# Patient Record
Sex: FEMALE | Race: BLACK OR AFRICAN AMERICAN | NOT HISPANIC OR LATINO | Employment: UNEMPLOYED | URBAN - METROPOLITAN AREA
[De-identification: names, ages, dates, MRNs, and addresses within clinical notes are randomized per-mention and may not be internally consistent; named-entity substitution may affect disease eponyms.]

---

## 2023-07-07 ENCOUNTER — HOSPITAL ENCOUNTER (OUTPATIENT)
Dept: DATA CONVERSION | Facility: HOSPITAL | Age: 28
End: 2023-07-07
Attending: STUDENT IN AN ORGANIZED HEALTH CARE EDUCATION/TRAINING PROGRAM
Payer: COMMERCIAL

## 2023-07-07 DIAGNOSIS — R20.0 ANESTHESIA OF SKIN: ICD-10-CM

## 2023-07-07 DIAGNOSIS — O36.5930 MATERNAL CARE FOR OTHER KNOWN OR SUSPECTED POOR FETAL GROWTH, THIRD TRIMESTER, NOT APPLICABLE OR UNSPECIFIED (HHS-HCC): ICD-10-CM

## 2023-07-07 DIAGNOSIS — R42 DIZZINESS AND GIDDINESS: ICD-10-CM

## 2023-07-07 DIAGNOSIS — O26.893 OTHER SPECIFIED PREGNANCY RELATED CONDITIONS, THIRD TRIMESTER (HHS-HCC): ICD-10-CM

## 2023-07-07 DIAGNOSIS — E66.9 OBESITY, UNSPECIFIED: ICD-10-CM

## 2023-07-07 DIAGNOSIS — O99.343 OTHER MENTAL DISORDERS COMPLICATING PREGNANCY, THIRD TRIMESTER (HHS-HCC): ICD-10-CM

## 2023-07-07 DIAGNOSIS — R55 SYNCOPE AND COLLAPSE: ICD-10-CM

## 2023-07-07 DIAGNOSIS — O99.213 OBESITY COMPLICATING PREGNANCY, THIRD TRIMESTER (HHS-HCC): ICD-10-CM

## 2023-07-07 DIAGNOSIS — H53.8 OTHER VISUAL DISTURBANCES: ICD-10-CM

## 2023-07-07 DIAGNOSIS — Z3A.32 32 WEEKS GESTATION OF PREGNANCY (HHS-HCC): ICD-10-CM

## 2023-07-07 DIAGNOSIS — F32.A DEPRESSION, UNSPECIFIED: ICD-10-CM

## 2023-07-07 DIAGNOSIS — R20.2 PARESTHESIA OF SKIN: ICD-10-CM

## 2023-07-07 DIAGNOSIS — O99.891 OTHER SPECIFIED DISEASES AND CONDITIONS COMPLICATING PREGNANCY (HHS-HCC): ICD-10-CM

## 2023-07-07 DIAGNOSIS — Z34.80 ENCOUNTER FOR SUPERVISION OF OTHER NORMAL PREGNANCY, UNSPECIFIED TRIMESTER (HHS-HCC): ICD-10-CM

## 2023-07-07 LAB
ALANINE AMINOTRANSFERASE (SGPT) (U/L) IN SER/PLAS: 11 U/L (ref 7–45)
ALBUMIN (G/DL) IN SER/PLAS: 3.7 G/DL (ref 3.4–5)
ALKALINE PHOSPHATASE (U/L) IN SER/PLAS: 116 U/L (ref 33–110)
ANION GAP IN SER/PLAS: 11 MMOL/L (ref 10–20)
ASPARTATE AMINOTRANSFERASE (SGOT) (U/L) IN SER/PLAS: 10 U/L (ref 9–39)
BILIRUBIN TOTAL (MG/DL) IN SER/PLAS: 0.4 MG/DL (ref 0–1.2)
CALCIUM (MG/DL) IN SER/PLAS: 8.8 MG/DL (ref 8.6–10.6)
CARBON DIOXIDE, TOTAL (MMOL/L) IN SER/PLAS: 24 MMOL/L (ref 21–32)
CHLORIDE (MMOL/L) IN SER/PLAS: 105 MMOL/L (ref 98–107)
CREATININE (MG/DL) IN SER/PLAS: 0.53 MG/DL (ref 0.5–1.05)
ERYTHROCYTE DISTRIBUTION WIDTH (RATIO) BY AUTOMATED COUNT: 13.3 % (ref 11.5–14.5)
ERYTHROCYTE MEAN CORPUSCULAR HEMOGLOBIN CONCENTRATION (G/DL) BY AUTOMATED: 32.2 G/DL (ref 32–36)
ERYTHROCYTE MEAN CORPUSCULAR VOLUME (FL) BY AUTOMATED COUNT: 94 FL (ref 80–100)
ERYTHROCYTES (10*6/UL) IN BLOOD BY AUTOMATED COUNT: 4.19 X10E12/L (ref 4–5.2)
GFR FEMALE: >90 ML/MIN/1.73M2
GLUCOSE (MG/DL) IN SER/PLAS: 64 MG/DL (ref 74–99)
HEMATOCRIT (%) IN BLOOD BY AUTOMATED COUNT: 39.4 % (ref 36–46)
HEMOGLOBIN (G/DL) IN BLOOD: 12.7 G/DL (ref 12–16)
LEUKOCYTES (10*3/UL) IN BLOOD BY AUTOMATED COUNT: 7.8 X10E9/L (ref 4.4–11.3)
NRBC (PER 100 WBCS) BY AUTOMATED COUNT: 0 /100 WBC (ref 0–0)
PLATELETS (10*3/UL) IN BLOOD AUTOMATED COUNT: 215 X10E9/L (ref 150–450)
POCT GLUCOSE: 58 MG/DL (ref 74–99)
POCT GLUCOSE: 73 MG/DL (ref 74–99)
POTASSIUM (MMOL/L) IN SER/PLAS: 4.1 MMOL/L (ref 3.5–5.3)
PROTEIN TOTAL: 7 G/DL (ref 6.4–8.2)
SODIUM (MMOL/L) IN SER/PLAS: 136 MMOL/L (ref 136–145)
UREA NITROGEN (MG/DL) IN SER/PLAS: 10 MG/DL (ref 6–23)

## 2023-08-08 ENCOUNTER — HOSPITAL ENCOUNTER (OUTPATIENT)
Dept: DATA CONVERSION | Facility: HOSPITAL | Age: 28
End: 2023-08-08
Attending: STUDENT IN AN ORGANIZED HEALTH CARE EDUCATION/TRAINING PROGRAM
Payer: COMMERCIAL

## 2023-08-08 DIAGNOSIS — E66.9 OBESITY, UNSPECIFIED: ICD-10-CM

## 2023-08-08 DIAGNOSIS — Z3A.37 37 WEEKS GESTATION OF PREGNANCY (HHS-HCC): ICD-10-CM

## 2023-08-08 DIAGNOSIS — O46.93 ANTEPARTUM HEMORRHAGE, UNSPECIFIED, THIRD TRIMESTER (HHS-HCC): ICD-10-CM

## 2023-08-08 DIAGNOSIS — O40.3XX0: ICD-10-CM

## 2023-08-08 DIAGNOSIS — O36.5930 MATERNAL CARE FOR OTHER KNOWN OR SUSPECTED POOR FETAL GROWTH, THIRD TRIMESTER, NOT APPLICABLE OR UNSPECIFIED (HHS-HCC): ICD-10-CM

## 2023-08-08 DIAGNOSIS — O26.853 SPOTTING COMPLICATING PREGNANCY, THIRD TRIMESTER (HHS-HCC): ICD-10-CM

## 2023-08-08 DIAGNOSIS — O99.213 OBESITY COMPLICATING PREGNANCY, THIRD TRIMESTER (HHS-HCC): ICD-10-CM

## 2023-08-08 LAB
CLUE CELLS WET PREP: NORMAL
TRICH WET PREP: NORMAL
WBC WET PREP: NORMAL /HPF
YEAST PRESENCE WET PREP: NORMAL

## 2023-08-09 LAB — URINE CULTURE: NORMAL

## 2023-09-29 VITALS — HEIGHT: 64 IN | BODY MASS INDEX: 35.45 KG/M2 | WEIGHT: 207.67 LBS

## 2023-09-29 VITALS — HEIGHT: 64 IN | WEIGHT: 203.04 LBS | BODY MASS INDEX: 34.66 KG/M2

## 2023-09-30 NOTE — PROGRESS NOTES
"    Current Stage:   Stage: Triage     OB Dating:   EDC/EGA:  ·  Final HAKEEM 28-Aug-2023   ·  EGA 37.1     Subjective Data:   Antepartum:  Vaginal Bleeding: Yes   Contractions/Abdominal Pain: Yes   Discharge/Loss of Fluid: No   Fetal Movement: Good   Fevers/Chills: No   Preeclampsia Symptoms: No   Antepartum:    Bradford is a 28yo  at 37.1wks by 6.1wk US who presents to triage for bleeding and an NST.  Patient states she received a BPP the other day and it was off for breathing and  she needed an NST.  She states she has \"been bleeding this whole pregnancy\".  She reports the most recent episode was 2-3 days ago.  It was dark red when she wiped.  Denies passage of clots.  Denies saturating pad or underwear.  Upon further discussion  with patient, she reports urine dips with blood present in the office.  Denies urinary symptoms.  Reports intermittent contractions.  Was closed on office exam today.  Denies LOF and endorses good fetal movement.    Pregnancy notable for:   - Follows w/ [facility]  - FGR, last growth : EFW 6% (2g), AC 6%  - Polyhydramnios 27.8cm on u  - Class 2 obesity  - Trich +, no MORGAN  - Varicella non-immune, offer PP  - GBS neg     OBHx:   - 2013  at 39wks, 6#11oz  - 3/2018  at 39wks, 6#2oz  - 2020  at 39wks, 6#13oz, shoulder dystocia  - 2021  at 38wks, FGR  GYNHx: Denies h/o abnl pap, trich as above  PMHx: Depression  PSHx: Denies  SHx: Denies tob/etoh/recreational drug use  FHx: Autism (brother), DM (MGM)  Meds: PNV, Tylenol PRN  Allergies: NKDA, fish        Objective Information:    Objective Information:      T   P  R  BP   MAP  SpO2   Value  36.6  92  16  118/71   87  100%  Date/Time  15:45  15:45  15:45  14:25   14:25  15:45  Range  (36.6C - 36.6C )  (84 - 101 )  (16 - 16 )  (118 - 118 )/ (71 - 71 )  (87 - 87 )  (97% - 100% )      Pain reported at  15:45: 0 = None      Physical Exam:   Constitutional: Alert, conversational, well-appearing "   Obstetric: FHT: 130, mod variability, +accels, -decels   [place]: irritability noted, irregular ctx q4-7min    SSE:  - Cervix visually not dilated  - No active bleeding from os  - No blood present in vault  - White discharge present   Eyes: Sclera white, EOM intact, no discharge or erythema   ENMT: No hearing deficit, no goiter present   Head/Neck: Normocephalic, atraumatic, full range  of motion intact   Respiratory/Thorax: No increased work of breathing,  no cough present, rate normal   Cardiovascular: No edema present   Gastrointestinal: Gravid   Genitourinary: No suprapubic tenderness   Musculoskeletal: Strength +5 in all extremities bilaterally   Extremities: No calf tenderness, redness or warmth   Neurological: A/O x3, conversational   Breast: No redness or warmth   Psychological: Behavior appropriate, interactive   Skin: No rashes or lesions      Testing:   NST Interpretation - Baby A:  ·  Baseline    ·  Variability moderate (amplitude range 6 to 25 bpm)   ·  Interpretation Reactive (2 15x15 accels)   ·  Accelerations Present   ·  Decelerations Absent     Assessment and Plan:        Additional Dx:   Vaginal bleeding during pregnancy: Entered Date: 08-Aug-2023  16:37   Non-stress test reactive: Entered Date: 22-Sep-2021 17:04    Assessment:    Bradford is a 26yo  at 37.1wks by 6.1wk US who presents to triage for bleeding and an NST    Vaginal Bleeding  - No active bleeding from os or blood present in vault  - Denies recent intercourse or trauma to abd  - Cervix: visually not dilated  - [place]: irritable, irregular ctx noted  - Blood Type: O+  - UCx and wet prep collected, will call for abnl results  - Precautions to return discussed    IUP at 37.1wks  - BPP this week 6/8, off for breathing, needed NST  - NST reactive  - Good fetal movement  - Continue routine prenatal care  - IOL at 38wks  - Precautions to return discussed     Maternal Well-being  - Vital signs stable and WNL  - All  questions and concerns addressed    Plan and tracing discussed with Dr. Curiel who reviewed tracing and agrees with d/c home.     Ivonne Govea, MSN, APRN, FNP-C      Attestation:   Note Completion:  I am a:  Advanced Practice Provider   Attending Only - Shared Visit with Advanced Practice Provider This is a shared visit.  I have reviewed the Advanced Practice Provider?s encounter note, approve the Advanced Practice Provider?s documentation,  and provide the following additional information from my personal encounter.    Comments/ Additional Findings    Patient seen. Agree with assessment and plan as documented. Briefly, patient is a  at 37w1d who presents with vaginal spotting at home and  for an NST. NST reactive. Speculum exam without e/o active bleeding. Stable for discharge.    Zulema Curiel MD  OB/GYN Attending Physician          Electronic Signatures:  Zulema Curiel)  (Signed 08-Aug-2023 16:54)   Authored: Note Completion   Co-Signer: Current Stage, OB Dating, Subjective Data, Objective Data,  Testing, Assessment and Plan, Note Completion  Ivonne Govea (APRN-CNP)  (Signed 08-Aug-2023 16:40)   Authored: Current Stage, OB Dating, Subjective Data,  Objective Data,  Testing, Assessment and Plan, Note Completion      Last Updated: 08-Aug-2023 16:54 by Zulema Curiel)

## 2023-09-30 NOTE — PROGRESS NOTES
Current Stage:   Stage: Triage     Subjective Data:   Antepartum:  Antepartum:    28yo  at 32.4wga () by 6w US presents from US for syncopal episode. She had the syncopal episode this morning during her US. Patient was lightheaded, had blurry vision,  numbness and tingling in her legs.    She had one syncopal episode () when patient fell in her kitchen. She also felt lightheaded one month ago, but no syncope.     Similar episodes in previous pregnancies.     No LOF, vaginal spotting 3 days ago, one contraction , baby is moving well.     Pregnancy notable for:   - Class 2 obesity  - FGR: EFW 1348g 7% AC 5% on     OBHx:   - 2013  at 39wks, 6#11oz  - 3/2018  at 39wks, 6#2oz  - 2020  at 39wks, 6#13oz, shoulder dystocia  - 2021  at 38wks, FGR  GYNHx: Denies h/o abnl pap, denies h/o STI  PMHx: Depression  PSHx: Denies  SHx: Denies tob/etoh/recreational drug use  FHx: Autism (brother), DM (MGM)  Meds: PNV, Tylenol PRN  Allergies: NKDA, fish          Physical Exam:   Constitutional: alert and oriented, no acute distress   Obstetric: TOCO: quiet  SVE: deferred   Eyes: EOMI   ENMT: MMM   Head/Neck: NCAT   Respiratory/Thorax: good respiratory effort   Cardiovascular: warm, well perfused   Gastrointestinal: abdomen gravid   Musculoskeletal: no deformities   Extremities: moving normally, no edema appreciated   Neurological: no gross deficits   Psychological: appropriate mood and affect   Skin: warm, dry      Testing:   NST Interpretation - Baby A:  ·  Baseline    ·  Variability moderate (amplitude range 6 to 25 bpm)   ·  Interpretation Reactive (2 15x15 accels)   ·  Accelerations +   ·  Decelerations -     Assessment and Plan:   Assessment:    28yo  at 32.4wga () by 6w US presents from US for syncopal episode.    Syncope  - VS wnl, EKG wnl, orthostatics neg - low concern for cardiac etiology  - CBC/CMP wnl - low concern for anemia/electrolyte abn  - BG low,  sx improved after PO - possibly d/t hypoglycemia vs vasovagal  - Did not fall  - Stable for dc, discussed return precautions    IUP  - NST reactive, reassuring  - PNC at [facility], up to date per pt    HPI written by Juanita Vang, MS3    D/w Dr. Karlene Duron MD  PGY-3, Obstetrics and Gynecology    Attestation:   Note Completion:  I am a:  Resident/Fellow   Attending Attestation I saw and evaluated the patient.  I personally obtained the key and critical portions of the history and physical exam or was physically present for key and  critical portions performed by the resident/fellow. I reviewed the resident/fellow?s documentation and discussed the patient with the resident/fellow.  I agree with the resident/fellow?s medical decision making as documented in the note.   I personally evaluated the patient on 2023         Electronic Signatures:  Lis Duron (Resident))  (Signed 2023 10:39)   Authored: Current Stage, Subjective Data, Objective Data,   Testing, Assessment and Plan, Note Completion  Meggan Soler)  (Signed 2023 19:23)   Authored: Subjective Data, Note Completion   Co-Signer: Current Stage, Subjective Data, Objective Data,  Testing, Assessment and Plan, Note Completion      Last Updated: 2023 19:23 by Meggan Soler)

## 2023-12-01 LAB — LAB AP CYTOLOGY REPORT FINAL EXTERNAL: NORMAL

## 2023-12-08 ENCOUNTER — ANCILLARY PROCEDURE (OUTPATIENT)
Dept: RADIOLOGY | Facility: CLINIC | Age: 28
End: 2023-12-08
Payer: COMMERCIAL

## 2023-12-08 DIAGNOSIS — Z03.74 ENCOUNTER FOR SUSPECTED PROBLEM WITH FETAL GROWTH RULED OUT: ICD-10-CM

## 2023-12-08 DIAGNOSIS — O36.5930 MATERNAL CARE FOR OTHER KNOWN OR SUSPECTED POOR FETAL GROWTH, THIRD TRIMESTER, NOT APPLICABLE OR UNSPECIFIED (HHS-HCC): ICD-10-CM

## 2023-12-08 PROCEDURE — 76801 OB US < 14 WKS SINGLE FETUS: CPT

## 2023-12-08 PROCEDURE — 76817 TRANSVAGINAL US OBSTETRIC: CPT

## 2023-12-08 PROCEDURE — 76856 US EXAM PELVIC COMPLETE: CPT

## 2023-12-08 PROCEDURE — 76817 TRANSVAGINAL US OBSTETRIC: CPT | Performed by: OBSTETRICS & GYNECOLOGY

## 2023-12-08 PROCEDURE — 76801 OB US < 14 WKS SINGLE FETUS: CPT | Performed by: OBSTETRICS & GYNECOLOGY

## 2023-12-22 ENCOUNTER — APPOINTMENT (OUTPATIENT)
Dept: RADIOLOGY | Facility: CLINIC | Age: 28
End: 2023-12-22
Payer: COMMERCIAL

## 2023-12-26 ENCOUNTER — HOSPITAL ENCOUNTER (OUTPATIENT)
Facility: HOSPITAL | Age: 28
Discharge: HOME | End: 2023-12-26
Attending: OBSTETRICS & GYNECOLOGY | Admitting: OBSTETRICS & GYNECOLOGY
Payer: COMMERCIAL

## 2023-12-26 ENCOUNTER — HOSPITAL ENCOUNTER (EMERGENCY)
Facility: HOSPITAL | Age: 28
Discharge: HOME | End: 2023-12-27
Payer: COMMERCIAL

## 2023-12-26 ENCOUNTER — HOSPITAL ENCOUNTER (OUTPATIENT)
Facility: HOSPITAL | Age: 28
End: 2023-12-26
Attending: OBSTETRICS & GYNECOLOGY | Admitting: OBSTETRICS & GYNECOLOGY
Payer: COMMERCIAL

## 2023-12-26 VITALS — HEART RATE: 78 BPM | SYSTOLIC BLOOD PRESSURE: 120 MMHG | DIASTOLIC BLOOD PRESSURE: 74 MMHG | OXYGEN SATURATION: 95 %

## 2023-12-26 VITALS
SYSTOLIC BLOOD PRESSURE: 104 MMHG | DIASTOLIC BLOOD PRESSURE: 64 MMHG | OXYGEN SATURATION: 97 % | HEART RATE: 91 BPM | WEIGHT: 190 LBS | RESPIRATION RATE: 18 BRPM | BODY MASS INDEX: 33.66 KG/M2 | TEMPERATURE: 96.8 F | HEIGHT: 63 IN

## 2023-12-26 PROCEDURE — 4500999001 HC ED NO CHARGE

## 2023-12-26 PROCEDURE — 99281 EMR DPT VST MAYX REQ PHY/QHP: CPT

## 2023-12-26 ASSESSMENT — COLUMBIA-SUICIDE SEVERITY RATING SCALE - C-SSRS
2. HAVE YOU ACTUALLY HAD ANY THOUGHTS OF KILLING YOURSELF?: NO
1. IN THE PAST MONTH, HAVE YOU WISHED YOU WERE DEAD OR WISHED YOU COULD GO TO SLEEP AND NOT WAKE UP?: NO
6. HAVE YOU EVER DONE ANYTHING, STARTED TO DO ANYTHING, OR PREPARED TO DO ANYTHING TO END YOUR LIFE?: NO

## 2023-12-27 NOTE — ED TRIAGE NOTES
PT REPORTS SLIP AND FALL DOWN 4 STAIRS AND IS REPORTING LOW BACK AND BUTT PAIN.  AND IS 7 WEEK OB. ENDORSES RIGHT SIED HEAD PAIN. DENIES LOC, DIZZINESS, BV, THINNERS.

## 2023-12-28 ENCOUNTER — ANCILLARY PROCEDURE (OUTPATIENT)
Dept: RADIOLOGY | Facility: CLINIC | Age: 28
End: 2023-12-28
Payer: COMMERCIAL

## 2023-12-28 DIAGNOSIS — Z03.74 ENCOUNTER FOR SUSPECTED PROBLEM WITH FETAL GROWTH RULED OUT: ICD-10-CM

## 2023-12-28 PROCEDURE — 76816 OB US FOLLOW-UP PER FETUS: CPT | Performed by: OBSTETRICS & GYNECOLOGY

## 2023-12-28 PROCEDURE — 76816 OB US FOLLOW-UP PER FETUS: CPT

## 2024-01-30 LAB
EXTERNAL CHLAMYDIA SCREEN: NEGATIVE
EXTERNAL GONORRHEA SCREEN: NEGATIVE

## 2024-02-01 ENCOUNTER — HOSPITAL ENCOUNTER (OUTPATIENT)
Dept: RADIOLOGY | Facility: CLINIC | Age: 29
Discharge: HOME | End: 2024-02-01
Payer: COMMERCIAL

## 2024-02-01 ENCOUNTER — APPOINTMENT (OUTPATIENT)
Dept: RADIOLOGY | Facility: CLINIC | Age: 29
End: 2024-02-01
Payer: COMMERCIAL

## 2024-02-01 DIAGNOSIS — Z36.82 ENCOUNTER FOR ANTENATAL SCREENING FOR NUCHAL TRANSLUCENCY (HHS-HCC): ICD-10-CM

## 2024-02-01 PROCEDURE — 76813 OB US NUCHAL MEAS 1 GEST: CPT

## 2024-02-01 PROCEDURE — 76813 OB US NUCHAL MEAS 1 GEST: CPT | Performed by: MEDICAL GENETICS

## 2024-02-23 ENCOUNTER — APPOINTMENT (OUTPATIENT)
Dept: RADIOLOGY | Facility: HOSPITAL | Age: 29
End: 2024-02-23
Payer: COMMERCIAL

## 2024-02-23 ENCOUNTER — CLINICAL SUPPORT (OUTPATIENT)
Dept: EMERGENCY MEDICINE | Facility: HOSPITAL | Age: 29
End: 2024-02-23
Payer: COMMERCIAL

## 2024-02-23 ENCOUNTER — HOSPITAL ENCOUNTER (EMERGENCY)
Facility: HOSPITAL | Age: 29
Discharge: HOME | End: 2024-02-23
Payer: COMMERCIAL

## 2024-02-23 VITALS
TEMPERATURE: 98.1 F | HEART RATE: 100 BPM | WEIGHT: 190 LBS | BODY MASS INDEX: 33.66 KG/M2 | HEIGHT: 63 IN | DIASTOLIC BLOOD PRESSURE: 74 MMHG | OXYGEN SATURATION: 96 % | RESPIRATION RATE: 18 BRPM | SYSTOLIC BLOOD PRESSURE: 115 MMHG

## 2024-02-23 DIAGNOSIS — Z34.92 SECOND TRIMESTER PREGNANCY (HHS-HCC): ICD-10-CM

## 2024-02-23 DIAGNOSIS — J10.1 INFLUENZA B: Primary | ICD-10-CM

## 2024-02-23 LAB
APPEARANCE UR: ABNORMAL
BILIRUB UR STRIP.AUTO-MCNC: NEGATIVE MG/DL
COLOR UR: YELLOW
FLUAV RNA RESP QL NAA+PROBE: NOT DETECTED
FLUBV RNA RESP QL NAA+PROBE: DETECTED
GLUCOSE UR STRIP.AUTO-MCNC: NORMAL MG/DL
KETONES UR STRIP.AUTO-MCNC: ABNORMAL MG/DL
LEUKOCYTE ESTERASE UR QL STRIP.AUTO: ABNORMAL
MUCOUS THREADS #/AREA URNS AUTO: ABNORMAL /LPF
NITRITE UR QL STRIP.AUTO: NEGATIVE
PH UR STRIP.AUTO: 6 [PH]
PROT UR STRIP.AUTO-MCNC: ABNORMAL MG/DL
RBC # UR STRIP.AUTO: ABNORMAL /UL
RBC #/AREA URNS AUTO: ABNORMAL /HPF
RSV RNA RESP QL NAA+PROBE: NOT DETECTED
S PYO DNA THROAT QL NAA+PROBE: NOT DETECTED
SARS-COV-2 RNA RESP QL NAA+PROBE: NOT DETECTED
SP GR UR STRIP.AUTO: 1.03
SQUAMOUS #/AREA URNS AUTO: ABNORMAL /HPF
UROBILINOGEN UR STRIP.AUTO-MCNC: NORMAL MG/DL
WBC #/AREA URNS AUTO: ABNORMAL /HPF

## 2024-02-23 PROCEDURE — 87086 URINE CULTURE/COLONY COUNT: CPT | Performed by: NURSE PRACTITIONER

## 2024-02-23 PROCEDURE — 2500000001 HC RX 250 WO HCPCS SELF ADMINISTERED DRUGS (ALT 637 FOR MEDICARE OP): Mod: SE | Performed by: NURSE PRACTITIONER

## 2024-02-23 PROCEDURE — 2500000002 HC RX 250 W HCPCS SELF ADMINISTERED DRUGS (ALT 637 FOR MEDICARE OP, ALT 636 FOR OP/ED): Mod: SE | Performed by: NURSE PRACTITIONER

## 2024-02-23 PROCEDURE — 87637 SARSCOV2&INF A&B&RSV AMP PRB: CPT | Performed by: EMERGENCY MEDICINE

## 2024-02-23 PROCEDURE — 99284 EMERGENCY DEPT VISIT MOD MDM: CPT | Mod: 25

## 2024-02-23 PROCEDURE — 87651 STREP A DNA AMP PROBE: CPT | Performed by: NURSE PRACTITIONER

## 2024-02-23 PROCEDURE — 81003 URINALYSIS AUTO W/O SCOPE: CPT | Performed by: NURSE PRACTITIONER

## 2024-02-23 PROCEDURE — 93005 ELECTROCARDIOGRAM TRACING: CPT

## 2024-02-23 PROCEDURE — 94644 CONT INHLJ TX 1ST HOUR: CPT

## 2024-02-23 PROCEDURE — 94640 AIRWAY INHALATION TREATMENT: CPT

## 2024-02-23 PROCEDURE — 71046 X-RAY EXAM CHEST 2 VIEWS: CPT | Performed by: RADIOLOGY

## 2024-02-23 PROCEDURE — 71046 X-RAY EXAM CHEST 2 VIEWS: CPT

## 2024-02-23 RX ORDER — GUAIFENESIN 100 MG/5ML
200 SOLUTION ORAL ONCE
Status: COMPLETED | OUTPATIENT
Start: 2024-02-23 | End: 2024-02-23

## 2024-02-23 RX ORDER — GUAIFENESIN 100 MG/5ML
100-200 SOLUTION ORAL EVERY 4 HOURS PRN
Qty: 120 ML | Refills: 0 | Status: SHIPPED | OUTPATIENT
Start: 2024-02-23 | End: 2024-03-24

## 2024-02-23 RX ORDER — ACETAMINOPHEN 325 MG/1
650 TABLET ORAL EVERY 6 HOURS PRN
Qty: 30 TABLET | Refills: 0 | Status: SHIPPED | OUTPATIENT
Start: 2024-02-23

## 2024-02-23 RX ORDER — ACETAMINOPHEN 325 MG/1
975 TABLET ORAL ONCE
Status: COMPLETED | OUTPATIENT
Start: 2024-02-23 | End: 2024-02-23

## 2024-02-23 RX ORDER — ALBUTEROL SULFATE 0.83 MG/ML
2.5 SOLUTION RESPIRATORY (INHALATION) ONCE
Status: COMPLETED | OUTPATIENT
Start: 2024-02-23 | End: 2024-02-23

## 2024-02-23 RX ADMIN — GUAIFENESIN 200 MG: 200 SOLUTION ORAL at 20:07

## 2024-02-23 RX ADMIN — ALBUTEROL SULFATE 2.5 MG: 2.5 SOLUTION RESPIRATORY (INHALATION) at 20:07

## 2024-02-23 RX ADMIN — ACETAMINOPHEN 975 MG: 325 TABLET ORAL at 20:07

## 2024-02-23 ASSESSMENT — PAIN DESCRIPTION - DESCRIPTORS: DESCRIPTORS: TIGHTNESS

## 2024-02-23 ASSESSMENT — LIFESTYLE VARIABLES
EVER FELT BAD OR GUILTY ABOUT YOUR DRINKING: NO
HAVE PEOPLE ANNOYED YOU BY CRITICIZING YOUR DRINKING: NO
EVER HAD A DRINK FIRST THING IN THE MORNING TO STEADY YOUR NERVES TO GET RID OF A HANGOVER: NO
HAVE YOU EVER FELT YOU SHOULD CUT DOWN ON YOUR DRINKING: NO

## 2024-02-23 ASSESSMENT — ENCOUNTER SYMPTOMS
CHILLS: 1
ABDOMINAL PAIN: 1
COUGH: 1
FATIGUE: 1

## 2024-02-23 ASSESSMENT — COLUMBIA-SUICIDE SEVERITY RATING SCALE - C-SSRS
1. IN THE PAST MONTH, HAVE YOU WISHED YOU WERE DEAD OR WISHED YOU COULD GO TO SLEEP AND NOT WAKE UP?: NO
2. HAVE YOU ACTUALLY HAD ANY THOUGHTS OF KILLING YOURSELF?: NO
6. HAVE YOU EVER DONE ANYTHING, STARTED TO DO ANYTHING, OR PREPARED TO DO ANYTHING TO END YOUR LIFE?: NO

## 2024-02-23 ASSESSMENT — PAIN - FUNCTIONAL ASSESSMENT: PAIN_FUNCTIONAL_ASSESSMENT: 0-10

## 2024-02-23 ASSESSMENT — PAIN SCALES - GENERAL
PAINLEVEL_OUTOF10: 4
PAINLEVEL_OUTOF10: 3

## 2024-02-23 ASSESSMENT — PAIN DESCRIPTION - PROGRESSION: CLINICAL_PROGRESSION: GRADUALLY IMPROVING

## 2024-02-23 NOTE — ED TRIAGE NOTES
"Cough, chest pain, shortness of breath, sore throat, headaches, body aches/ \"knots\", fevers and chills, with congestion for 2 days. She reports she is 15 weeks pregnant with due date of August 8th. She reports abdominal pain with body aches, nausea and vomiting - denies vaginal bleeding and discharge. Denies concern for STD's. This is pt's 6th pregnancy with 5 live births.   "

## 2024-02-24 LAB — HOLD SPECIMEN: NORMAL

## 2024-02-24 NOTE — ED PROVIDER NOTES
Emergency Department Encounter  [facility] EMERGENCY MEDICINE    Patient: Bradford Clinton  MRN: [id number]  : 1995  Date of Evaluation: 2024  ED Provider: RAMONA Santana      Chief Complaint       Chief Complaint   Patient presents with    Flu Symptoms    Abdominal Pain        Limitations to History: none  Historian: patient  Records reviewed: EMR inpatient and outpatient notes, Care Everywhere    This is a 28-year-old female who is approximately 15 weeks pregnant who presents to the emergency room with flu symptoms.  Patient states 2 days ago she developed diffuse body aches, headache, chest pain, shortness of breath and a nonproductive cough.  Denies any known sick contacts.  Patient endorses subjective fevers without any chills.  Patient endorses lower abdominal cramping.  Denies any vaginal bleeding or vaginal discharge.  Patient states that she vomited 4 times today but is able to tolerate water at this time.    PMH: Denies  PSH: Left wrist surgery  Allergies: Reviewed per EMR  Social HX: Denies smoking, alcohol or drug use.  Family HX: No family history pertinent to current presenting problem  Medications: Reviewed per EMR    ROS:     Review of Systems   Constitutional:  Positive for chills and fatigue.   Respiratory:  Positive for cough.    Gastrointestinal:  Positive for abdominal pain.     14 systems reviewed and otherwise acutely negative except as in the [place].        Past History     Past Medical History:   Diagnosis Date    Other conditions influencing health status 2021    No significant past medical history     Past Surgical History:   Procedure Laterality Date    OTHER SURGICAL HISTORY  2019    No history of surgery         Medications/Allergies     Previous Medications    ACETAMINOPHEN (TYLENOL) 325 MG TABLET    TAKE 3 TABLETS BY MOUTH EVERY 6 HOURS AS NEEDED FOR PAIN    IBUPROFEN 600 MG TABLET    TAKE 1 TABLET BY MOUTH EVERY 6 HOURS AS NEEDED FOR  PAIN AS DIRECTED     Allergies   Allergen Reactions    Ibuprofen Other     Itching, hives    Iodine Other    Shellfish Containing Products Other    Shellfish Derived Other        Physical Exam       ED Triage Vitals [02/23/24 1828]   Temperature Heart Rate Respirations BP   36.6 °C (97.9 °F) 98 18 116/67      Pulse Ox Temp Source Heart Rate Source Patient Position   98 % Temporal -- --      BP Location FiO2 (%)     -- 21 %       Physical Exam:    Appearance: Alert, oriented , cooperative,  in no acute distress. Well nourished & well hydrated.    Skin: Intact,  dry skin, no lesions, rash, petechiae or purpura.     Eyes: PERRLA, EOMs intact.    ENT: Hearing grossly intact. External auditory canals patent, tympanic membranes intact with visible landmarks. Nares patent, mucus membranes moist.     Neck: Supple, without meningismus.     Pulmonary: Clear lungs. No use of accessory muscles.    Cardiac: Normal S1, S2 without murmur, rub, gallop or extrasystole. No JVD, Carotids without bruits.    Abdomen: Soft, nontender, active bowel sounds.  No palpable organomegaly.  No rebound or guarding.      Musculoskeletal: Full range of motion. no pain, edema, or deformity. Pulses full and equal. No cyanosis, clubbing, or edema.    Neurological:  Normal sensation, no weakness, no focal findings identified.    Psychiatric: Appropriate mood and affect.       Diagnostics   Labs:  Results for orders placed or performed during the hospital encounter of 02/23/24 (from the past 24 hour(s))   Influenza A, and B PCR   Result Value Ref Range    Flu A Result Not Detected Not Detected    Flu B Result Detected (A) Not Detected   Sars-CoV-2 PCR   Result Value Ref Range    Coronavirus 2019, PCR Not Detected Not Detected   RSV PCR   Result Value Ref Range    RSV PCR Not Detected Not Detected   Group A Streptococcus, PCR    Specimen: Throat/Pharynx; Swab   Result Value Ref Range    Group A Strep PCR Not Detected Not Detected      Radiographs:  XR  "chest 2 views   Final Result   No acute cardiopulmonary process.        I personally reviewed the images/study and I agree with the findings   as stated above by resident physician, Dr. Danny Bar. The   study was interpreted at [facility] in [facility].        MACRO:   none.        Signed by: Melania Begum 2/23/2024 9:20 PM   Dictation workstation:   [id number]      Point of Care Ultrasound   Final Result                Assessment   In brief, Bradford Clinton is a 28 y.o. female who presented to the emergency department with body aches, chest pain, headache and flu symptoms.          ED Course/MDM   Visit Vitals  /74   Pulse 100   Temp 36.7 °C (98.1 °F) (Oral)   Resp 18   Ht 1.6 m (5' 3\")   Wt 86.2 kg (190 lb)   SpO2 96%   BMI 33.66 kg/m²   OB Status Pregnant   Smoking Status Never   BSA 1.96 m²       Medications   albuterol 2.5 mg /3 mL (0.083 %) nebulizer solution 2.5 mg (2.5 mg nebulization Given 2/23/24 2007)   acetaminophen (Tylenol) tablet 975 mg (975 mg oral Given 2/23/24 2007)   guaiFENesin (Robitussin) 100 mg/5 mL syrup 200 mg (200 mg oral Given 2/23/24 2007)       Patient remained stable while in the emergency department. Previous outpatient and ED records were reviewed.  Differentials include pneumonia, COVID, influenza, viral illness. Outside records were reviewed.  Bedside ultrasound was performed, please see procedure note for complete details.  Patient tested positive for flu B.  Chest x-ray shows no acute cardiopulmonary process.  Patient received 1 albuterol nebulizer, Tylenol and Robitussin for symptoms.  Urinalysis was obtained, pending results.  Patient was requesting to leave prior to the UA coming back.  Discussed results with the patient.  Repeat vital signs were stable.  Patient was requesting a prescription for a humidifier which was given to the patient.  Patient also received a prescription for Robitussin cough syrup and Tylenol.  " Patient was able to tolerate oral liquids while in the emergency room without difficulty.  Patient was discharged home and was advised to follow-up with her primary care doctor and return the emergency room with worsening symptoms.    Final Impression      1. Influenza B    2. Second trimester pregnancy          DISPOSITION  Disposition: Discharged home    Comment: Please note this report has been produced using speech recognition software and may contain errors related to that system including errors in grammar, punctuation, and spelling, as well as words and phrases that may be inappropriate.  If there are any questions or concerns please feel free to contact the dictating provider for clarification.    Kacey Magana, APRN-CNP         Kacey Magana APRN-JUANI  02/23/24 [id number]

## 2024-02-24 NOTE — ED PROCEDURE NOTE
Procedure    Performed by: Lynda Mckeon MD  Authorized by: Lynda Mckeon MD        Genitourinary Indications: evaluation for fetal cardiac activity and positive pregnancy test            Comments: . Dated 15w1d based on head circumference. Fetal activity and movement noted.                Lynda Mckeon MD  Resident  02/23/24 2021

## 2024-02-25 LAB
ATRIAL RATE: 110 BPM
BACTERIA UR CULT: NORMAL
P AXIS: 49 DEGREES
P OFFSET: 208 MS
P ONSET: 149 MS
PR INTERVAL: 154 MS
Q ONSET: 226 MS
QRS COUNT: 18 BEATS
QRS DURATION: 82 MS
QT INTERVAL: 324 MS
QTC CALCULATION(BAZETT): 438 MS
QTC FREDERICIA: 396 MS
R AXIS: -11 DEGREES
T AXIS: 22 DEGREES
T OFFSET: 388 MS
VENTRICULAR RATE: 110 BPM

## 2024-03-21 ENCOUNTER — HOSPITAL ENCOUNTER (OUTPATIENT)
Dept: RADIOLOGY | Facility: CLINIC | Age: 29
Discharge: HOME | End: 2024-03-21
Payer: COMMERCIAL

## 2024-03-21 ENCOUNTER — APPOINTMENT (OUTPATIENT)
Dept: RADIOLOGY | Facility: CLINIC | Age: 29
End: 2024-03-21
Payer: COMMERCIAL

## 2024-03-21 DIAGNOSIS — Z36.3 ENCOUNTER FOR ANTENATAL SCREENING FOR MALFORMATIONS (HHS-HCC): ICD-10-CM

## 2024-03-21 PROCEDURE — 76811 OB US DETAILED SNGL FETUS: CPT

## 2024-03-21 PROCEDURE — 76811 OB US DETAILED SNGL FETUS: CPT | Performed by: OBSTETRICS & GYNECOLOGY

## 2024-03-22 LAB — THYROTROPIN (MIU/L) IN SER/PLAS BY DETECTION LIMIT <= 0.05 MIU/L EXTERNAL: 0.79 MIU/L

## 2024-04-26 ENCOUNTER — HOSPITAL ENCOUNTER (OUTPATIENT)
Dept: RADIOLOGY | Facility: CLINIC | Age: 29
Discharge: HOME | End: 2024-04-26
Payer: COMMERCIAL

## 2024-04-26 DIAGNOSIS — Z03.74 ENCOUNTER FOR SUSPECTED PROBLEM WITH FETAL GROWTH RULED OUT: ICD-10-CM

## 2024-04-26 DIAGNOSIS — Z36.3 ENCOUNTER FOR ANTENATAL SCREENING FOR MALFORMATIONS (HHS-HCC): ICD-10-CM

## 2024-04-26 PROCEDURE — 76816 OB US FOLLOW-UP PER FETUS: CPT

## 2024-04-26 PROCEDURE — 76816 OB US FOLLOW-UP PER FETUS: CPT | Performed by: OBSTETRICS & GYNECOLOGY

## 2024-05-30 ENCOUNTER — HOSPITAL ENCOUNTER (OUTPATIENT)
Dept: RADIOLOGY | Facility: CLINIC | Age: 29
Discharge: HOME | End: 2024-05-30
Payer: COMMERCIAL

## 2024-05-30 DIAGNOSIS — Z36.3 ENCOUNTER FOR ANTENATAL SCREENING FOR MALFORMATIONS (HHS-HCC): ICD-10-CM

## 2024-05-30 PROCEDURE — 76816 OB US FOLLOW-UP PER FETUS: CPT

## 2024-05-30 PROCEDURE — 76819 FETAL BIOPHYS PROFIL W/O NST: CPT

## 2024-05-30 PROCEDURE — 76819 FETAL BIOPHYS PROFIL W/O NST: CPT | Performed by: OBSTETRICS & GYNECOLOGY

## 2024-05-30 PROCEDURE — 76816 OB US FOLLOW-UP PER FETUS: CPT | Performed by: OBSTETRICS & GYNECOLOGY

## 2024-06-23 ENCOUNTER — HOSPITAL ENCOUNTER (OUTPATIENT)
Facility: HOSPITAL | Age: 29
Discharge: HOME | End: 2024-06-23
Attending: STUDENT IN AN ORGANIZED HEALTH CARE EDUCATION/TRAINING PROGRAM | Admitting: STUDENT IN AN ORGANIZED HEALTH CARE EDUCATION/TRAINING PROGRAM
Payer: COMMERCIAL

## 2024-06-23 ENCOUNTER — HOSPITAL ENCOUNTER (OUTPATIENT)
Facility: HOSPITAL | Age: 29
End: 2024-06-23
Attending: STUDENT IN AN ORGANIZED HEALTH CARE EDUCATION/TRAINING PROGRAM | Admitting: STUDENT IN AN ORGANIZED HEALTH CARE EDUCATION/TRAINING PROGRAM
Payer: COMMERCIAL

## 2024-06-23 VITALS
HEART RATE: 92 BPM | SYSTOLIC BLOOD PRESSURE: 117 MMHG | RESPIRATION RATE: 18 BRPM | BODY MASS INDEX: 37.94 KG/M2 | HEIGHT: 64 IN | WEIGHT: 222.22 LBS | OXYGEN SATURATION: 97 % | DIASTOLIC BLOOD PRESSURE: 66 MMHG | TEMPERATURE: 97.3 F

## 2024-06-23 DIAGNOSIS — J10.1 INFLUENZA B: ICD-10-CM

## 2024-06-23 LAB
BILIRUBIN, POC: NEGATIVE
BLOOD URINE, POC: POSITIVE
CLARITY, POC: ABNORMAL
CLUE CELLS SPEC QL WET PREP: NORMAL
COLOR, POC: ABNORMAL
GLUCOSE URINE, POC: NEGATIVE
KETONES, POC: POSITIVE
LEUKOCYTE EST, POC: NEGATIVE
NITRITE, POC: NEGATIVE
PH, POC: 6.5
POC APPEARANCE OF BODY FLUID: ABNORMAL
SPECIFIC GRAVITY, POC: 1.02
T VAGINALIS SPEC QL WET PREP: NORMAL
URINE PROTEIN, POC: ABNORMAL
UROBILINOGEN, POC: 2
WBC VAG QL WET PREP: NORMAL
YEAST VAG QL WET PREP: NORMAL

## 2024-06-23 PROCEDURE — 99212 OFFICE O/P EST SF 10 MIN: CPT | Performed by: STUDENT IN AN ORGANIZED HEALTH CARE EDUCATION/TRAINING PROGRAM

## 2024-06-23 PROCEDURE — 87086 URINE CULTURE/COLONY COUNT: CPT

## 2024-06-23 PROCEDURE — 87081 CULTURE SCREEN ONLY: CPT | Mod: 59

## 2024-06-23 PROCEDURE — 87210 SMEAR WET MOUNT SALINE/INK: CPT

## 2024-06-23 PROCEDURE — 99215 OFFICE O/P EST HI 40 MIN: CPT

## 2024-06-23 RX ORDER — HYDRALAZINE HYDROCHLORIDE 20 MG/ML
5 INJECTION INTRAMUSCULAR; INTRAVENOUS ONCE AS NEEDED
Status: DISCONTINUED | OUTPATIENT
Start: 2024-06-23 | End: 2024-06-23 | Stop reason: HOSPADM

## 2024-06-23 RX ORDER — ACETAMINOPHEN 325 MG/1
975 TABLET ORAL ONCE
Status: COMPLETED | OUTPATIENT
Start: 2024-06-23 | End: 2024-06-23

## 2024-06-23 RX ORDER — ONDANSETRON HYDROCHLORIDE 2 MG/ML
4 INJECTION, SOLUTION INTRAVENOUS EVERY 6 HOURS PRN
Status: DISCONTINUED | OUTPATIENT
Start: 2024-06-23 | End: 2024-06-23 | Stop reason: HOSPADM

## 2024-06-23 RX ORDER — ASPIRIN 81 MG/1
81 TABLET ORAL DAILY
COMMUNITY

## 2024-06-23 RX ORDER — ONDANSETRON 4 MG/1
4 TABLET, FILM COATED ORAL EVERY 6 HOURS PRN
Status: DISCONTINUED | OUTPATIENT
Start: 2024-06-23 | End: 2024-06-23 | Stop reason: HOSPADM

## 2024-06-23 RX ORDER — NIFEDIPINE 10 MG/1
10 CAPSULE ORAL ONCE AS NEEDED
Status: DISCONTINUED | OUTPATIENT
Start: 2024-06-23 | End: 2024-06-23 | Stop reason: HOSPADM

## 2024-06-23 RX ORDER — LABETALOL HYDROCHLORIDE 5 MG/ML
20 INJECTION, SOLUTION INTRAVENOUS ONCE AS NEEDED
Status: DISCONTINUED | OUTPATIENT
Start: 2024-06-23 | End: 2024-06-23 | Stop reason: HOSPADM

## 2024-06-23 RX ORDER — LORATADINE 10 MG/1
10 TABLET ORAL DAILY
COMMUNITY

## 2024-06-23 RX ORDER — ACETAMINOPHEN 325 MG/1
975 TABLET ORAL EVERY 6 HOURS PRN
Qty: 30 TABLET | Refills: 0 | Status: SHIPPED | OUTPATIENT
Start: 2024-06-23

## 2024-06-23 RX ADMIN — ACETAMINOPHEN 975 MG: 325 TABLET ORAL at 12:10

## 2024-06-23 SDOH — SOCIAL STABILITY: SOCIAL INSECURITY: DO YOU FEEL ANYONE HAS EXPLOITED OR TAKEN ADVANTAGE OF YOU FINANCIALLY OR OF YOUR PERSONAL PROPERTY?: NO

## 2024-06-23 SDOH — SOCIAL STABILITY: SOCIAL INSECURITY: HAVE YOU HAD ANY THOUGHTS OF HARMING ANYONE ELSE?: NO

## 2024-06-23 SDOH — HEALTH STABILITY: MENTAL HEALTH: NON-SPECIFIC ACTIVE SUICIDAL THOUGHTS (PAST 1 MONTH): NO

## 2024-06-23 SDOH — HEALTH STABILITY: MENTAL HEALTH: HAVE YOU USED ANY SUBSTANCES (CANABIS, COCAINE, HEROIN, HALLUCINOGENS, INHALANTS, ETC.) IN THE PAST 12 MONTHS?: NO

## 2024-06-23 SDOH — HEALTH STABILITY: MENTAL HEALTH: HAVE YOU USED ANY PRESCRIPTION DRUGS OTHER THAN PRESCRIBED IN THE PAST 12 MONTHS?: NO

## 2024-06-23 SDOH — HEALTH STABILITY: MENTAL HEALTH: WERE YOU ABLE TO COMPLETE ALL THE BEHAVIORAL HEALTH SCREENINGS?: YES

## 2024-06-23 SDOH — SOCIAL STABILITY: SOCIAL INSECURITY: HAS ANYONE EVER THREATENED TO HURT YOUR FAMILY OR YOUR PETS?: NO

## 2024-06-23 SDOH — SOCIAL STABILITY: SOCIAL INSECURITY: PHYSICAL ABUSE: DENIES

## 2024-06-23 SDOH — SOCIAL STABILITY: SOCIAL INSECURITY: HAVE YOU HAD THOUGHTS OF HARMING ANYONE ELSE?: NO

## 2024-06-23 SDOH — SOCIAL STABILITY: SOCIAL INSECURITY: DOES ANYONE TRY TO KEEP YOU FROM HAVING/CONTACTING OTHER FRIENDS OR DOING THINGS OUTSIDE YOUR HOME?: NO

## 2024-06-23 SDOH — SOCIAL STABILITY: SOCIAL INSECURITY: ARE YOU OR HAVE YOU BEEN THREATENED OR ABUSED PHYSICALLY, EMOTIONALLY, OR SEXUALLY BY ANYONE?: NO

## 2024-06-23 SDOH — HEALTH STABILITY: MENTAL HEALTH: WISH TO BE DEAD (PAST 1 MONTH): NO

## 2024-06-23 SDOH — SOCIAL STABILITY: SOCIAL INSECURITY: ARE THERE ANY APPARENT SIGNS OF INJURIES/BEHAVIORS THAT COULD BE RELATED TO ABUSE/NEGLECT?: NO

## 2024-06-23 SDOH — ECONOMIC STABILITY: HOUSING INSECURITY: DO YOU FEEL UNSAFE GOING BACK TO THE PLACE WHERE YOU ARE LIVING?: NO

## 2024-06-23 SDOH — HEALTH STABILITY: MENTAL HEALTH: SUICIDAL BEHAVIOR (LIFETIME): NO

## 2024-06-23 SDOH — SOCIAL STABILITY: SOCIAL INSECURITY: ABUSE SCREEN: ADULT

## 2024-06-23 SDOH — SOCIAL STABILITY: SOCIAL INSECURITY: VERBAL ABUSE: DENIES

## 2024-06-23 ASSESSMENT — PAIN SCALES - GENERAL: PAINLEVEL_OUTOF10: 7

## 2024-06-23 ASSESSMENT — LIFESTYLE VARIABLES
HOW OFTEN DO YOU HAVE A DRINK CONTAINING ALCOHOL: NEVER
HOW MANY STANDARD DRINKS CONTAINING ALCOHOL DO YOU HAVE ON A TYPICAL DAY: PATIENT DOES NOT DRINK
AUDIT-C TOTAL SCORE: 0
SKIP TO QUESTIONS 9-10: 1
AUDIT-C TOTAL SCORE: 0
HOW OFTEN DO YOU HAVE 6 OR MORE DRINKS ON ONE OCCASION: NEVER

## 2024-06-23 ASSESSMENT — PATIENT HEALTH QUESTIONNAIRE - PHQ9
2. FEELING DOWN, DEPRESSED OR HOPELESS: NOT AT ALL
1. LITTLE INTEREST OR PLEASURE IN DOING THINGS: SEVERAL DAYS
SUM OF ALL RESPONSES TO PHQ9 QUESTIONS 1 & 2: 1

## 2024-06-23 ASSESSMENT — PAIN DESCRIPTION - LOCATION: LOCATION: ABDOMEN

## 2024-06-23 NOTE — H&P
Obstetrical Triage History and Physical     Bradford Clinton is a 28 y.o.  at 33w0d presenting with cramping and spotting.    Chief Complaint: Contractions and Vaginal Bleeding (Cramping /contraction since last night noted blood after using the restroom this morning)    Assessment/Plan    Spotting/cramping  - No ctx on TOCO or by palpation  - SVE: closed  - SSE: no blood in vault. Normal appearing cervix  - No evidence of PTL or abruption at this time   - Urine culture sent   - tylenol and PO hydration in triage    Vaginal discharge  -patient notes slightly increased discharge. Nitrazine neg, no pooling. Likely physioloigic discharge  -wet prep sent    Maternal wellbeing  - No acute distress  - Vitals stable and WNL    Fetal Well-Being  - FHT: NST reactive in triage  - Continue current prenatal care  - GBS collected    Dispo  - Safe and stable for d/c to home  - Follow-up with OB provider as scheduled    Reviewed labor precautions with patient. Discussed need to return to labor and delivery if patient has strong, regular contractions, leakage of fluid, vaginal bleeding, or decreased fetal movement. Patient expresses understanding. Safe and stable for discharge at this time.    Pt seen and discussed with Dr. Karlene Becker MD PGY-1    Attending attestation:   I saw and evaluated the patient. I personally obtained the key and critical portions of the history and physical exam or was physically present for key and critical portions performed by the resident/ABIEL. I reviewed the resident/ABIEL's documentation and discussed the patient with the resident/ABIEL. I agree with the resident/ABIEL's medical decision making as documented in the note.     Meggan Soler MD  OBGYN Attending       Sofia Felder is here complaining of spotting and cramping. States she has had mild cramping and torrey victoria for past week. No ctx this AM but having mild cramping. This AM woke up with light pink  spots in  the toilet and like pink specks on her sheets. No clots or bright red blood. No dysuria or urinary frequency. Good fetal movement. Denies leaking of fluid.          Obstetrical History   OB History    Para Term  AB Living   6 5 5 0 0 5   SAB IAB Ectopic Multiple Live Births   0 0 0 0 5      # Outcome Date GA Lbr Michael/2nd Weight Sex Delivery Anes PTL Lv   6 Current            5 Term            4 Term            3 Term            2 Term            1 Term                Past Medical History  Past Medical History:   Diagnosis Date    Other conditions influencing health status 2021    No significant past medical history        Past Surgical History   Past Surgical History:   Procedure Laterality Date    OTHER SURGICAL HISTORY  2019    No history of surgery       Social History  Social History     Tobacco Use    Smoking status: Never    Smokeless tobacco: Never   Substance Use Topics    Alcohol use: Not on file     Substance and Sexual Activity   Drug Use Not on file       Allergies  Ibuprofen, Iodine, Shellfish containing products, and Shellfish derived     Medications  Medications Prior to Admission   Medication Sig Dispense Refill Last Dose    aspirin 81 mg EC tablet Take 1 tablet (81 mg) by mouth once daily.   Past Week    loratadine (Claritin) 10 mg tablet Take 1 tablet (10 mg) by mouth once daily.   2024    acetaminophen (TylenoL) 325 mg tablet Take 2 tablets (650 mg) by mouth every 6 hours if needed for mild pain (1 - 3) or fever (temp greater than 38.0 C). 30 tablet 0     ibuprofen 600 mg tablet TAKE 1 TABLET BY MOUTH EVERY 6 HOURS AS NEEDED FOR PAIN AS DIRECTED 60 tablet 0        Objective    Last Vitals  Temp Pulse Resp BP MAP O2 Sat   (!) 35.8 °C (96.4 °F) 92 18 117/66   97 %     Physical Examination  General: no acute distress  HEENT: normocephalic, atraumatic  Heart: warm and well perfused  Lungs: no excessive respiratory effort  Abdomen: gravid, non tender to  palpation  Extremities: moving all extremities  Neuro: awake and conversant  Psych: appropriate mood and affect  SVE: 0/30/-3  SSE: no blood or clot in vault. Thin white discharge noted. Normal appearing cervix  NST: 130/mod/+accel/-decel  Leslie: no ctx    Lab Review  Labs in chart were reviewed.

## 2024-06-24 LAB — BACTERIA UR CULT: NO GROWTH

## 2024-06-25 LAB — GP B STREP GENITAL QL CULT: NORMAL

## 2024-07-09 LAB
EXTERNAL HEMATOCRIT: 34 %
EXTERNAL HEMOGLOBIN: 11.1 G/DL
GLUCOSE, 1 HR SCREEN, PREG EXTERNAL: 107 MG/DL
HEMOGLOBIN A1C/HEMOGLOBIN TOTAL IN BLOOD EXTERNAL: 5.6 %
HEPATITIS C VIRUS AB PRESENCE IN SERUM EXTERNAL: NONREACTIVE
HIV 1/ 2 AG/AB SCREEN EXTERNAL: NONREACTIVE
PLATELETS (10*3/UL) IN BLOOD AUTOMATED COUNT EXTERNAL: 178 X10*3/UL
SYPHILIS TOTAL AB EXTERNAL: NORMAL

## 2024-07-11 ENCOUNTER — HOSPITAL ENCOUNTER (OUTPATIENT)
Dept: RADIOLOGY | Facility: CLINIC | Age: 29
Discharge: HOME | End: 2024-07-11
Payer: COMMERCIAL

## 2024-07-11 DIAGNOSIS — Z36.3 ENCOUNTER FOR ANTENATAL SCREENING FOR MALFORMATIONS (HHS-HCC): ICD-10-CM

## 2024-07-11 DIAGNOSIS — O36.5911 IUGR (INTRAUTERINE GROWTH RESTRICTION) AFFECTING CARE OF MOTHER, FIRST TRIMESTER, FETUS 1 (HHS-HCC): ICD-10-CM

## 2024-07-11 PROCEDURE — 76820 UMBILICAL ARTERY ECHO: CPT

## 2024-07-11 PROCEDURE — 76819 FETAL BIOPHYS PROFIL W/O NST: CPT | Performed by: OBSTETRICS & GYNECOLOGY

## 2024-07-11 PROCEDURE — 76816 OB US FOLLOW-UP PER FETUS: CPT | Performed by: OBSTETRICS & GYNECOLOGY

## 2024-07-11 PROCEDURE — 76819 FETAL BIOPHYS PROFIL W/O NST: CPT

## 2024-07-11 PROCEDURE — 76816 OB US FOLLOW-UP PER FETUS: CPT

## 2024-07-11 PROCEDURE — 76820 UMBILICAL ARTERY ECHO: CPT | Performed by: OBSTETRICS & GYNECOLOGY

## 2024-07-16 ENCOUNTER — INITIAL PRENATAL (OUTPATIENT)
Dept: OBSTETRICS AND GYNECOLOGY | Facility: CLINIC | Age: 29
End: 2024-07-16
Payer: COMMERCIAL

## 2024-07-16 VITALS — DIASTOLIC BLOOD PRESSURE: 68 MMHG | BODY MASS INDEX: 38.72 KG/M2 | WEIGHT: 225.6 LBS | SYSTOLIC BLOOD PRESSURE: 102 MMHG

## 2024-07-16 DIAGNOSIS — O99.340 DEPRESSION AFFECTING PREGNANCY (HHS-HCC): ICD-10-CM

## 2024-07-16 DIAGNOSIS — Z36.4 ULTRASOUND FOR ANTENATAL SCREENING FOR FETAL GROWTH RESTRICTION (HHS-HCC): ICD-10-CM

## 2024-07-16 DIAGNOSIS — O09.299 H/O SHOULDER DYSTOCIA IN PRIOR PREGNANCY, CURRENTLY PREGNANT (HHS-HCC): ICD-10-CM

## 2024-07-16 DIAGNOSIS — Z3A.36 36 WEEKS GESTATION OF PREGNANCY (HHS-HCC): Primary | ICD-10-CM

## 2024-07-16 DIAGNOSIS — F32.A DEPRESSION AFFECTING PREGNANCY (HHS-HCC): ICD-10-CM

## 2024-07-16 PROCEDURE — 99214 OFFICE O/P EST MOD 30 MIN: CPT | Mod: GC | Performed by: STUDENT IN AN ORGANIZED HEALTH CARE EDUCATION/TRAINING PROGRAM

## 2024-07-16 PROCEDURE — 99214 OFFICE O/P EST MOD 30 MIN: CPT | Performed by: STUDENT IN AN ORGANIZED HEALTH CARE EDUCATION/TRAINING PROGRAM

## 2024-07-16 ASSESSMENT — ENCOUNTER SYMPTOMS
CONSTITUTIONAL NEGATIVE: 0
RESPIRATORY NEGATIVE: 0
ALLERGIC/IMMUNOLOGIC NEGATIVE: 0
PSYCHIATRIC NEGATIVE: 0
ENDOCRINE NEGATIVE: 0
CARDIOVASCULAR NEGATIVE: 0
NEUROLOGICAL NEGATIVE: 0
EYES NEGATIVE: 0
GASTROINTESTINAL NEGATIVE: 0
MUSCULOSKELETAL NEGATIVE: 0
HEMATOLOGIC/LYMPHATIC NEGATIVE: 0

## 2024-07-16 ASSESSMENT — EDINBURGH POSTNATAL DEPRESSION SCALE (EPDS)
TOTAL SCORE: 7
THE THOUGHT OF HARMING MYSELF HAS OCCURRED TO ME: NEVER
I HAVE FELT SCARED OR PANICKY FOR NO GOOD REASON: NO, NOT AT ALL
THINGS HAVE BEEN GETTING ON TOP OF ME: YES, SOMETIMES I HAVEN'T BEEN COPING AS WELL AS USUAL
I HAVE LOOKED FORWARD WITH ENJOYMENT TO THINGS: AS MUCH AS I EVER DID
I HAVE BEEN ABLE TO LAUGH AND SEE THE FUNNY SIDE OF THINGS: AS MUCH AS I ALWAYS COULD
I HAVE BEEN ANXIOUS OR WORRIED FOR NO GOOD REASON: NO, NOT AT ALL
I HAVE BLAMED MYSELF UNNECESSARILY WHEN THINGS WENT WRONG: YES, SOME OF THE TIME
I HAVE BEEN SO UNHAPPY THAT I HAVE HAD DIFFICULTY SLEEPING: NOT AT ALL
I HAVE BEEN SO UNHAPPY THAT I HAVE BEEN CRYING: ONLY OCCASIONALLY
I HAVE FELT SAD OR MISERABLE: YES, QUITE OFTEN

## 2024-07-16 ASSESSMENT — PATIENT HEALTH QUESTIONNAIRE - PHQ9
1. LITTLE INTEREST OR PLEASURE IN DOING THINGS: SEVERAL DAYS
SUM OF ALL RESPONSES TO PHQ9 QUESTIONS 1 AND 2: 2
2. FEELING DOWN, DEPRESSED OR HOPELESS: SEVERAL DAYS
10. IF YOU CHECKED OFF ANY PROBLEMS, HOW DIFFICULT HAVE THESE PROBLEMS MADE IT FOR YOU TO DO YOUR WORK, TAKE CARE OF THINGS AT HOME, OR GET ALONG WITH OTHER PEOPLE: NOT DIFFICULT AT ALL

## 2024-07-16 NOTE — ASSESSMENT & PLAN NOTE
FGR last growth US 7/11 EFW 2313g (9%), AC 17%, normal dopplers   Weekly BPP with dopplers, scheduled 7/19  Repeat growth US on 8/1 to determine delivery timing (38 vs 39 weeks)

## 2024-07-16 NOTE — ASSESSMENT & PLAN NOTE
Reviewed risk of PPD  No current SI/HI  Offered OBGYN behavioral health, patient to consider, currently declines

## 2024-07-16 NOTE — PROGRESS NOTES
Subjective   Patient ID [id number]   Bradford Clinton is a 28 y.o.  at 36w2d with a working estimated date of delivery of 2024, by Ultrasound who presents for an initial prenatal visit.     Patient presenting as a transfer of care from [facility] for new FGR (see below). Feeling well overall. Having some cramping, timing contractions and not too frequent yet. Denies VB, LOF. Reports good fetal movement.    Her pregnancy is complicated by:  - Late transfer of care from [facility]  - Short interval pregnancy, last delivery   - FGR, EFW 9%, AC 17%, normal dopplers  - Depression, no medications  - History of shoulder dystocia in 2020, relieved with Gualberto, no deficits    Denies food insecurity, housing insecurity. Denies medical problems. History of wrist surgery, no abdominal surgeries.    OB History    Para Term  AB Living   6 5 5 0 0 5   SAB IAB Ectopic Multiple Live Births   0 0 0 0 5      # Outcome Date GA Lbr Michael/2nd Weight Sex Type Anes PTL Lv   6 Current            5 Term  38w0d    Vag-Spont         Complications: Fetal growth restriction antepartum (HHS-HCC)   4 Term  38w0d  2.637 kg  Vag-Spont         Complications: Fetal growth restriction antepartum ([facility]HS-HCC)   3 Term 2020   3.09 kg  Vag-Spont         Birth Comments: Relieved with Gualberto, no deficits      Complications: Shoulder Dystocia   2 Term 2018 39w0d  2.778 kg  Vag-Spont   KETAN   1 Term 2013 39w0d  3.033 kg  Vag-Spont        [place]  Depression Scale Total: 7    Objective   Physical Exam  Weight: 102 kg (225 lb 9.6 oz)  Expected Total Weight Gain: Could not be calculated   Pregravid BMI: Could not be calculated  BP: 102/68    Fetal Heart Rate: 148     General: no acute distress  HEENT: normocephalic, atraumatic  Heart: warm and well perfused  Lungs: breathing comfortably on room air  Abdomen: gravid  Extremities: moving all extremities  Neuro: awake and conversant  Psych: appropriate mood and affect    Prenatal  Labs  GBS negative  Other records requested from [facility]    Assessment/Plan   Problem List Items Addressed This Visit             ICD-10-CM    Ultrasound for  screening for fetal growth restriction ([facility]) Z36.4     FGR last growth US  EFW 2313g (9%), AC 17%, normal dopplers   Weekly BPP with dopplers, scheduled   Repeat growth US on  to determine delivery timing (38 vs 39 weeks)         36 weeks gestation of pregnancy ([facility]) - Primary Z3A.36     Late transfer of care at 36 weeks from [facility] for FGR   Oriented to practice  OB checklist completed. ROR from [facility] requested. Patient reports all normal labs (CBC, 1hr, Tdap, neg STI testing)  GBS negative , for repeat collection  if still pregnant  Nexplanon for ppBC, breastfeeding  All SVDs, planning on vaginal delivery  Reviewed labor precautions         Grand multiparity in labor and delivery, unspecified trimester ([facility]) O80     For T&C on presentation to L&D  Accepts blood products         Depression affecting pregnancy ([facility]) O99.340, F32.A     Reviewed risk of PPD  No current SI/HI  Offered OBGYN behavioral health, patient to consider, currently declines         H/O shoulder dystocia in prior pregnancy, currently pregnant ([facility]) O09.299     Discussed risk, patient desires vaginal delivery          Return in one week. Seen and discussed with Dr. Em Keller MD  PGY-4, Obstetrics and Gynecology

## 2024-07-16 NOTE — ASSESSMENT & PLAN NOTE
Late transfer of care at 36 weeks from [facility] for FGR   Oriented to practice  OB checklist completed. ROR from [facility] requested. Patient reports all normal labs (CBC, 1hr, Tdap, neg STI testing)  GBS negative 6/23, for repeat collection 7/28 if still pregnant  Nexplanon for ppBC, breastfeeding  All SVDs, planning on vaginal delivery  Reviewed labor precautions

## 2024-07-16 NOTE — PROGRESS NOTES
I saw and evaluated the patient. I personally obtained the key and critical portions of the history and physical exam or was physically present for key and critical portions performed by the resident/fellow. I reviewed the resident/fellow's documentation and discussed the patient with the resident/fellow. I agree with the resident/fellow's medical decision making as documented in the note.    Asim Strickland MD

## 2024-07-19 ENCOUNTER — HOSPITAL ENCOUNTER (OUTPATIENT)
Dept: RADIOLOGY | Facility: CLINIC | Age: 29
Discharge: HOME | End: 2024-07-19
Payer: COMMERCIAL

## 2024-07-19 DIAGNOSIS — O36.5920: ICD-10-CM

## 2024-07-19 DIAGNOSIS — Z36.3 ENCOUNTER FOR ANTENATAL SCREENING FOR MALFORMATIONS (HHS-HCC): ICD-10-CM

## 2024-07-19 PROCEDURE — 76819 FETAL BIOPHYS PROFIL W/O NST: CPT

## 2024-07-19 PROCEDURE — 76820 UMBILICAL ARTERY ECHO: CPT

## 2024-07-25 ENCOUNTER — ROUTINE PRENATAL (OUTPATIENT)
Dept: OBSTETRICS AND GYNECOLOGY | Facility: CLINIC | Age: 29
End: 2024-07-25
Payer: COMMERCIAL

## 2024-07-25 ENCOUNTER — HOSPITAL ENCOUNTER (OUTPATIENT)
Dept: RADIOLOGY | Facility: CLINIC | Age: 29
Discharge: HOME | End: 2024-07-25
Payer: COMMERCIAL

## 2024-07-25 VITALS — DIASTOLIC BLOOD PRESSURE: 78 MMHG | WEIGHT: 220.4 LBS | SYSTOLIC BLOOD PRESSURE: 124 MMHG | BODY MASS INDEX: 37.83 KG/M2

## 2024-07-25 DIAGNOSIS — O36.5920: ICD-10-CM

## 2024-07-25 DIAGNOSIS — Z36.3 ENCOUNTER FOR ANTENATAL SCREENING FOR MALFORMATIONS (HHS-HCC): ICD-10-CM

## 2024-07-25 DIAGNOSIS — Z3A.36 36 WEEKS GESTATION OF PREGNANCY (HHS-HCC): ICD-10-CM

## 2024-07-25 DIAGNOSIS — Z36.4 ULTRASOUND FOR ANTENATAL SCREENING FOR FETAL GROWTH RESTRICTION (HHS-HCC): ICD-10-CM

## 2024-07-25 DIAGNOSIS — R51.9 PREGNANCY HEADACHE IN THIRD TRIMESTER (HHS-HCC): ICD-10-CM

## 2024-07-25 DIAGNOSIS — T78.40XA ALLERGY, INITIAL ENCOUNTER: Primary | ICD-10-CM

## 2024-07-25 DIAGNOSIS — O26.893 PREGNANCY HEADACHE IN THIRD TRIMESTER (HHS-HCC): ICD-10-CM

## 2024-07-25 PROCEDURE — 76820 UMBILICAL ARTERY ECHO: CPT

## 2024-07-25 PROCEDURE — 99214 OFFICE O/P EST MOD 30 MIN: CPT | Mod: GC,TH

## 2024-07-25 PROCEDURE — 76819 FETAL BIOPHYS PROFIL W/O NST: CPT

## 2024-07-25 PROCEDURE — 87081 CULTURE SCREEN ONLY: CPT

## 2024-07-25 RX ORDER — TRIAMCINOLONE ACETONIDE 55 UG/1
2 SPRAY, METERED NASAL DAILY
Qty: 16.5 G | Refills: 11 | Status: SHIPPED | OUTPATIENT
Start: 2024-07-25 | End: 2025-07-25

## 2024-07-25 RX ORDER — ACETAMINOPHEN 325 MG/1
975 TABLET ORAL EVERY 6 HOURS PRN
Qty: 30 TABLET | Refills: 0 | Status: SHIPPED | OUTPATIENT
Start: 2024-07-25

## 2024-07-25 RX ORDER — LANOLIN ALCOHOL/MO/W.PET/CERES
400 CREAM (GRAM) TOPICAL NIGHTLY
Qty: 30 TABLET | Refills: 11 | Status: SHIPPED | OUTPATIENT
Start: 2024-07-25 | End: 2025-07-25

## 2024-07-25 RX ORDER — CETIRIZINE HYDROCHLORIDE 10 MG/1
10 TABLET ORAL DAILY
Qty: 30 TABLET | Refills: 11 | Status: SHIPPED | OUTPATIENT
Start: 2024-07-25 | End: 2025-07-25

## 2024-07-25 NOTE — PROGRESS NOTES
Subjective   Patient ID [id number]   Bradford Clinton is a 28 y.o.  at 37w4d with a working estimated date of delivery of 2024, by Ultrasound who presents for a routine prenatal visit. She denies vaginal bleeding, leakage of fluid, decreased fetal movements, or contractions.    Reports headache today, she thinks related to allergies. Normotensive.   Prenatal records not yet received, patient encouraged to call again.     Her pregnancy is complicated by:  - FGR, last growth EFW 9%, Dopplers today wnl, recommend 39.0 delivery per [facility]  - h/o shoulder dystocia in P3    Objective   Physical Exam:   Weight: 100 kg (220 lb 6.4 oz)  Expected Total Weight Gain: Could not be calculated   Pregravid BMI: Could not be calculated  BP: 124/78 (HR 96)  Fetal Heart Rate: US               Prenatal Labs  Urine Dip:  Lab Results   Component Value Date    KETONESU Positive 2024    GLUCOSEUR NEGATIVE 2024    LEUKOCYTESUR NEGATIVE 2024     Lab Results   Component Value Date    HGB 12.0 2023    HCT 35.0 (L) 2023    ABO O 2023    HEPBSAG NONREACTIVE 2021       Assessment/Plan   Problem List Items Addressed This Visit       Ultrasound for  screening for fetal growth restriction ([facility])    Overview     FGR last growth US  EFW 2313g (9%), AC 17%, normal dopplers  Weekly BPP with dopplers  Repeat growth US on  to determine delivery timing (38 vs 39 weeks)         Current Assessment & Plan     For IOL at 39.0, tasked to schedule          RESOLVED: 36 weeks gestation of pregnancy ([facility])    Relevant Orders    Group B Streptococcus (GBS) Prenatal Screen, Culture    Pregnancy headache in third trimester ([facility])    Relevant Medications    acetaminophen (TylenoL) 325 mg tablet    magnesium oxide (Mag-Ox) 400 mg (241.3 mg magnesium) tablet     Other Visit Diagnoses       Allergy, initial encounter    -  Primary    Relevant Medications    cetirizine (ZyrTEC) 10 mg tablet     triamcinolone (Nasacort) 55 mcg nasal inhaler          Continue prenatal vitamin.  GBS taken again  Expected mode of delivery , IOL tasked to be scheduled for next week    Seen and discussed with Dr. Kurtis Cortes MD  PGY-3, Obstetrics and Gynecology

## 2024-07-26 ENCOUNTER — TELEPHONE (OUTPATIENT)
Dept: OBSTETRICS AND GYNECOLOGY | Facility: CLINIC | Age: 29
End: 2024-07-26
Payer: COMMERCIAL

## 2024-07-26 NOTE — TELEPHONE ENCOUNTER
Pt notified and agrees to present  ----- Message from Anisa Cortes sent at 7/25/2024  4:56 PM EDT -----  Regarding: scheudle IOL  Hi can we schedule this patient for IOL for FGR on 8/1? (39.0)  Thank you!

## 2024-07-27 LAB — GP B STREP GENITAL QL CULT: NORMAL

## 2024-07-27 NOTE — PROGRESS NOTES
I saw and evaluated the patient. I personally obtained the key and critical portions of the history and physical exam or was physically present for key and critical portions performed by the resident/fellow. I reviewed the resident/fellow's documentation and discussed the patient with the resident/fellow. I agree with the resident/fellow's medical decision making as documented in the note.    Shazia Hull MD

## 2024-07-29 DIAGNOSIS — Z36.4 ULTRASOUND FOR ANTENATAL SCREENING FOR FETAL GROWTH RESTRICTION (HHS-HCC): Primary | ICD-10-CM

## 2024-07-30 DIAGNOSIS — Z36.4 ULTRASOUND FOR ANTENATAL SCREENING FOR FETAL GROWTH RESTRICTION (HHS-HCC): Primary | ICD-10-CM

## 2024-08-02 ENCOUNTER — HOSPITAL ENCOUNTER (INPATIENT)
Facility: HOSPITAL | Age: 29
End: 2024-08-02
Attending: OBSTETRICS & GYNECOLOGY | Admitting: STUDENT IN AN ORGANIZED HEALTH CARE EDUCATION/TRAINING PROGRAM
Payer: COMMERCIAL

## 2024-08-02 ENCOUNTER — APPOINTMENT (OUTPATIENT)
Dept: OBSTETRICS AND GYNECOLOGY | Facility: HOSPITAL | Age: 29
End: 2024-08-02
Payer: COMMERCIAL

## 2024-08-02 DIAGNOSIS — D62 ACUTE BLOOD LOSS ANEMIA: ICD-10-CM

## 2024-08-02 DIAGNOSIS — Z36.4 ULTRASOUND FOR ANTENATAL SCREENING FOR FETAL GROWTH RESTRICTION (HHS-HCC): ICD-10-CM

## 2024-08-02 PROBLEM — Z34.90 ENCOUNTER FOR INDUCTION OF LABOR (HHS-HCC): Status: ACTIVE | Noted: 2024-08-02

## 2024-08-02 LAB
ABO GROUP (TYPE) IN BLOOD: NORMAL
ANTIBODY SCREEN: NORMAL
ERYTHROCYTE [DISTWIDTH] IN BLOOD BY AUTOMATED COUNT: 14.2 % (ref 11.5–14.5)
HCT VFR BLD AUTO: 33.1 % (ref 36–46)
HGB BLD-MCNC: 11 G/DL (ref 12–16)
MCH RBC QN AUTO: 29.3 PG (ref 26–34)
MCHC RBC AUTO-ENTMCNC: 33.2 G/DL (ref 32–36)
MCV RBC AUTO: 88 FL (ref 80–100)
NRBC BLD-RTO: 0 /100 WBCS (ref 0–0)
PLATELET # BLD AUTO: 189 X10*3/UL (ref 150–450)
RBC # BLD AUTO: 3.75 X10*6/UL (ref 4–5.2)
RH FACTOR (ANTIGEN D): NORMAL
WBC # BLD AUTO: 7.1 X10*3/UL (ref 4.4–11.3)

## 2024-08-02 PROCEDURE — 3E033VJ INTRODUCTION OF OTHER HORMONE INTO PERIPHERAL VEIN, PERCUTANEOUS APPROACH: ICD-10-PCS | Performed by: STUDENT IN AN ORGANIZED HEALTH CARE EDUCATION/TRAINING PROGRAM

## 2024-08-02 PROCEDURE — 86780 TREPONEMA PALLIDUM: CPT | Performed by: STUDENT IN AN ORGANIZED HEALTH CARE EDUCATION/TRAINING PROGRAM

## 2024-08-02 PROCEDURE — 36415 COLL VENOUS BLD VENIPUNCTURE: CPT | Performed by: STUDENT IN AN ORGANIZED HEALTH CARE EDUCATION/TRAINING PROGRAM

## 2024-08-02 PROCEDURE — 2500000004 HC RX 250 GENERAL PHARMACY W/ HCPCS (ALT 636 FOR OP/ED): Performed by: STUDENT IN AN ORGANIZED HEALTH CARE EDUCATION/TRAINING PROGRAM

## 2024-08-02 PROCEDURE — 1120000001 HC OB PRIVATE ROOM DAILY

## 2024-08-02 PROCEDURE — 59025 FETAL NON-STRESS TEST: CPT | Mod: GC

## 2024-08-02 PROCEDURE — 85027 COMPLETE CBC AUTOMATED: CPT | Performed by: STUDENT IN AN ORGANIZED HEALTH CARE EDUCATION/TRAINING PROGRAM

## 2024-08-02 PROCEDURE — 2500000002 HC RX 250 W HCPCS SELF ADMINISTERED DRUGS (ALT 637 FOR MEDICARE OP, ALT 636 FOR OP/ED)

## 2024-08-02 PROCEDURE — 86901 BLOOD TYPING SEROLOGIC RH(D): CPT | Performed by: STUDENT IN AN ORGANIZED HEALTH CARE EDUCATION/TRAINING PROGRAM

## 2024-08-02 PROCEDURE — 7210000002 HC LABOR PER HOUR

## 2024-08-02 PROCEDURE — 59025 FETAL NON-STRESS TEST: CPT

## 2024-08-02 RX ORDER — METOCLOPRAMIDE 10 MG/1
10 TABLET ORAL EVERY 6 HOURS PRN
Status: DISCONTINUED | OUTPATIENT
Start: 2024-08-02 | End: 2024-08-04

## 2024-08-02 RX ORDER — TERBUTALINE SULFATE 1 MG/ML
0.25 INJECTION SUBCUTANEOUS ONCE AS NEEDED
Status: COMPLETED | OUTPATIENT
Start: 2024-08-02 | End: 2024-08-03

## 2024-08-02 RX ORDER — FLUTICASONE PROPIONATE 50 MCG
2 SPRAY, SUSPENSION (ML) NASAL DAILY
Status: DISCONTINUED | OUTPATIENT
Start: 2024-08-02 | End: 2024-08-07 | Stop reason: HOSPADM

## 2024-08-02 RX ORDER — MISOPROSTOL 200 UG/1
800 TABLET ORAL ONCE AS NEEDED
Status: DISCONTINUED | OUTPATIENT
Start: 2024-08-02 | End: 2024-08-04 | Stop reason: HOSPADM

## 2024-08-02 RX ORDER — NIFEDIPINE 10 MG/1
10 CAPSULE ORAL ONCE AS NEEDED
Status: DISCONTINUED | OUTPATIENT
Start: 2024-08-02 | End: 2024-08-04 | Stop reason: HOSPADM

## 2024-08-02 RX ORDER — OXYTOCIN/0.9 % SODIUM CHLORIDE 30/500 ML
2-30 PLASTIC BAG, INJECTION (ML) INTRAVENOUS CONTINUOUS
Status: DISCONTINUED | OUTPATIENT
Start: 2024-08-02 | End: 2024-08-04

## 2024-08-02 RX ORDER — LABETALOL HYDROCHLORIDE 5 MG/ML
20 INJECTION, SOLUTION INTRAVENOUS ONCE AS NEEDED
Status: DISCONTINUED | OUTPATIENT
Start: 2024-08-02 | End: 2024-08-04 | Stop reason: HOSPADM

## 2024-08-02 RX ORDER — LOPERAMIDE HYDROCHLORIDE 2 MG/1
4 CAPSULE ORAL EVERY 2 HOUR PRN
Status: DISCONTINUED | OUTPATIENT
Start: 2024-08-02 | End: 2024-08-04 | Stop reason: HOSPADM

## 2024-08-02 RX ORDER — HYDRALAZINE HYDROCHLORIDE 20 MG/ML
5 INJECTION INTRAMUSCULAR; INTRAVENOUS ONCE AS NEEDED
Status: DISCONTINUED | OUTPATIENT
Start: 2024-08-02 | End: 2024-08-04 | Stop reason: HOSPADM

## 2024-08-02 RX ORDER — ONDANSETRON HYDROCHLORIDE 2 MG/ML
4 INJECTION, SOLUTION INTRAVENOUS EVERY 6 HOURS PRN
Status: DISCONTINUED | OUTPATIENT
Start: 2024-08-02 | End: 2024-08-04

## 2024-08-02 RX ORDER — CARBOPROST TROMETHAMINE 250 UG/ML
250 INJECTION, SOLUTION INTRAMUSCULAR ONCE AS NEEDED
Status: DISCONTINUED | OUTPATIENT
Start: 2024-08-02 | End: 2024-08-04 | Stop reason: HOSPADM

## 2024-08-02 RX ORDER — SODIUM CHLORIDE, SODIUM LACTATE, POTASSIUM CHLORIDE, CALCIUM CHLORIDE 600; 310; 30; 20 MG/100ML; MG/100ML; MG/100ML; MG/100ML
125 INJECTION, SOLUTION INTRAVENOUS CONTINUOUS
Status: DISCONTINUED | OUTPATIENT
Start: 2024-08-02 | End: 2024-08-04

## 2024-08-02 RX ORDER — FLUTICASONE PROPIONATE 50 MCG
2 SPRAY, SUSPENSION (ML) NASAL DAILY
Status: DISCONTINUED | OUTPATIENT
Start: 2024-08-03 | End: 2024-08-02

## 2024-08-02 RX ORDER — CETIRIZINE HYDROCHLORIDE 10 MG/1
10 TABLET ORAL DAILY
Status: DISCONTINUED | OUTPATIENT
Start: 2024-08-03 | End: 2024-08-07 | Stop reason: HOSPADM

## 2024-08-02 RX ORDER — METHYLERGONOVINE MALEATE 0.2 MG/ML
0.2 INJECTION INTRAVENOUS ONCE AS NEEDED
Status: DISCONTINUED | OUTPATIENT
Start: 2024-08-02 | End: 2024-08-04 | Stop reason: HOSPADM

## 2024-08-02 RX ORDER — METOCLOPRAMIDE HYDROCHLORIDE 5 MG/ML
10 INJECTION INTRAMUSCULAR; INTRAVENOUS EVERY 6 HOURS PRN
Status: DISCONTINUED | OUTPATIENT
Start: 2024-08-02 | End: 2024-08-04

## 2024-08-02 RX ORDER — OXYTOCIN 10 [USP'U]/ML
10 INJECTION, SOLUTION INTRAMUSCULAR; INTRAVENOUS ONCE AS NEEDED
Status: DISCONTINUED | OUTPATIENT
Start: 2024-08-02 | End: 2024-08-04 | Stop reason: HOSPADM

## 2024-08-02 RX ORDER — LIDOCAINE HYDROCHLORIDE 10 MG/ML
30 INJECTION INFILTRATION; PERINEURAL ONCE AS NEEDED
Status: DISCONTINUED | OUTPATIENT
Start: 2024-08-02 | End: 2024-08-04 | Stop reason: HOSPADM

## 2024-08-02 RX ORDER — ONDANSETRON 4 MG/1
4 TABLET, FILM COATED ORAL EVERY 6 HOURS PRN
Status: DISCONTINUED | OUTPATIENT
Start: 2024-08-02 | End: 2024-08-04

## 2024-08-02 RX ORDER — TRANEXAMIC ACID 100 MG/ML
1000 INJECTION, SOLUTION INTRAVENOUS ONCE AS NEEDED
Status: DISCONTINUED | OUTPATIENT
Start: 2024-08-02 | End: 2024-08-04 | Stop reason: HOSPADM

## 2024-08-02 RX ORDER — OXYTOCIN/0.9 % SODIUM CHLORIDE 30/500 ML
60 PLASTIC BAG, INJECTION (ML) INTRAVENOUS ONCE AS NEEDED
Status: DISCONTINUED | OUTPATIENT
Start: 2024-08-02 | End: 2024-08-04 | Stop reason: HOSPADM

## 2024-08-02 SDOH — SOCIAL STABILITY: SOCIAL INSECURITY: ARE YOU OR HAVE YOU BEEN THREATENED OR ABUSED PHYSICALLY, EMOTIONALLY, OR SEXUALLY BY ANYONE?: YES

## 2024-08-02 SDOH — ECONOMIC STABILITY: FOOD INSECURITY: WITHIN THE PAST 12 MONTHS, YOU WORRIED THAT YOUR FOOD WOULD RUN OUT BEFORE YOU GOT MONEY TO BUY MORE.: SOMETIMES TRUE

## 2024-08-02 SDOH — HEALTH STABILITY: MENTAL HEALTH: HAVE YOU USED ANY SUBSTANCES (CANABIS, COCAINE, HEROIN, HALLUCINOGENS, INHALANTS, ETC.) IN THE PAST 12 MONTHS?: NO

## 2024-08-02 SDOH — SOCIAL STABILITY: SOCIAL INSECURITY: HAS ANYONE EVER THREATENED TO HURT YOUR FAMILY OR YOUR PETS?: NO

## 2024-08-02 SDOH — HEALTH STABILITY: MENTAL HEALTH: HAVE YOU USED ANY PRESCRIPTION DRUGS OTHER THAN PRESCRIBED IN THE PAST 12 MONTHS?: NO

## 2024-08-02 SDOH — SOCIAL STABILITY: SOCIAL INSECURITY: ABUSE SCREEN: ADULT

## 2024-08-02 SDOH — HEALTH STABILITY: MENTAL HEALTH: SUICIDAL BEHAVIOR (LIFETIME): NO

## 2024-08-02 SDOH — ECONOMIC STABILITY: FOOD INSECURITY: WITHIN THE PAST 12 MONTHS, THE FOOD YOU BOUGHT JUST DIDN'T LAST AND YOU DIDN'T HAVE MONEY TO GET MORE.: SOMETIMES TRUE

## 2024-08-02 SDOH — SOCIAL STABILITY: SOCIAL INSECURITY: PHYSICAL ABUSE: DENIES

## 2024-08-02 SDOH — SOCIAL STABILITY: SOCIAL INSECURITY: ARE YOU OR HAVE YOU BEEN THREATENED OR ABUSED PHYSICALLY, EMOTIONALLY, OR SEXUALLY BY ANYONE?: NO

## 2024-08-02 SDOH — HEALTH STABILITY: MENTAL HEALTH: WERE YOU ABLE TO COMPLETE ALL THE BEHAVIORAL HEALTH SCREENINGS?: YES

## 2024-08-02 SDOH — SOCIAL STABILITY: SOCIAL INSECURITY: HAVE YOU HAD THOUGHTS OF HARMING ANYONE ELSE?: NO

## 2024-08-02 SDOH — SOCIAL STABILITY: SOCIAL INSECURITY: HAVE YOU HAD ANY THOUGHTS OF HARMING ANYONE ELSE?: NO

## 2024-08-02 SDOH — SOCIAL STABILITY: SOCIAL INSECURITY: ARE THERE ANY APPARENT SIGNS OF INJURIES/BEHAVIORS THAT COULD BE RELATED TO ABUSE/NEGLECT?: NO

## 2024-08-02 SDOH — HEALTH STABILITY: MENTAL HEALTH: WISH TO BE DEAD (PAST 1 MONTH): NO

## 2024-08-02 SDOH — SOCIAL STABILITY: SOCIAL INSECURITY: VERBAL ABUSE: DENIES

## 2024-08-02 SDOH — SOCIAL STABILITY: SOCIAL INSECURITY: DOES ANYONE TRY TO KEEP YOU FROM HAVING/CONTACTING OTHER FRIENDS OR DOING THINGS OUTSIDE YOUR HOME?: NO

## 2024-08-02 SDOH — HEALTH STABILITY: MENTAL HEALTH: NON-SPECIFIC ACTIVE SUICIDAL THOUGHTS (PAST 1 MONTH): NO

## 2024-08-02 SDOH — ECONOMIC STABILITY: HOUSING INSECURITY: DO YOU FEEL UNSAFE GOING BACK TO THE PLACE WHERE YOU ARE LIVING?: NO

## 2024-08-02 SDOH — SOCIAL STABILITY: SOCIAL INSECURITY: DO YOU FEEL ANYONE HAS EXPLOITED OR TAKEN ADVANTAGE OF YOU FINANCIALLY OR OF YOUR PERSONAL PROPERTY?: NO

## 2024-08-02 ASSESSMENT — LIFESTYLE VARIABLES
HOW OFTEN DO YOU HAVE SIX OR MORE DRINKS ON ONE OCCASION: NEVER
HOW MANY STANDARD DRINKS CONTAINING ALCOHOL DO YOU HAVE ON A TYPICAL DAY: 1 OR 2
AUDIT-C TOTAL SCORE: 0
AUDIT-C TOTAL SCORE: 0
HOW MANY STANDARD DRINKS CONTAINING ALCOHOL DO YOU HAVE ON A TYPICAL DAY: PATIENT DOES NOT DRINK
HOW OFTEN DO YOU HAVE 6 OR MORE DRINKS ON ONE OCCASION: NEVER
HOW OFTEN DO YOU HAVE A DRINK CONTAINING ALCOHOL: NEVER
SKIP TO QUESTIONS 9-10: 1

## 2024-08-02 ASSESSMENT — ACTIVITIES OF DAILY LIVING (ADL)
GROOMING: INDEPENDENT
JUDGMENT_ADEQUATE_SAFELY_COMPLETE_DAILY_ACTIVITIES: YES
ADEQUATE_TO_COMPLETE_ADL: YES
HEARING - RIGHT EAR: FUNCTIONAL
DRESSING YOURSELF: INDEPENDENT
TOILETING: INDEPENDENT
LACK_OF_TRANSPORTATION: NO
WALKS IN HOME: INDEPENDENT
PATIENT'S MEMORY ADEQUATE TO SAFELY COMPLETE DAILY ACTIVITIES?: YES
BATHING: INDEPENDENT
FEEDING YOURSELF: INDEPENDENT
HEARING - LEFT EAR: FUNCTIONAL

## 2024-08-02 ASSESSMENT — PATIENT HEALTH QUESTIONNAIRE - PHQ9
1. LITTLE INTEREST OR PLEASURE IN DOING THINGS: SEVERAL DAYS
2. FEELING DOWN, DEPRESSED OR HOPELESS: SEVERAL DAYS
SUM OF ALL RESPONSES TO PHQ9 QUESTIONS 1 & 2: 2

## 2024-08-02 ASSESSMENT — PAIN SCALES - GENERAL: PAINLEVEL_OUTOF10: 6

## 2024-08-03 ENCOUNTER — ANESTHESIA EVENT (OUTPATIENT)
Dept: OBSTETRICS AND GYNECOLOGY | Facility: HOSPITAL | Age: 29
End: 2024-08-03
Payer: COMMERCIAL

## 2024-08-03 ENCOUNTER — ANESTHESIA (OUTPATIENT)
Dept: OBSTETRICS AND GYNECOLOGY | Facility: HOSPITAL | Age: 29
End: 2024-08-03
Payer: COMMERCIAL

## 2024-08-03 LAB — TREPONEMA PALLIDUM IGG+IGM AB [PRESENCE] IN SERUM OR PLASMA BY IMMUNOASSAY: NONREACTIVE

## 2024-08-03 PROCEDURE — 2720000007 HC OR 272 NO HCPCS: Performed by: OBSTETRICS & GYNECOLOGY

## 2024-08-03 PROCEDURE — 1120000001 HC OB PRIVATE ROOM DAILY

## 2024-08-03 PROCEDURE — 2500000005 HC RX 250 GENERAL PHARMACY W/O HCPCS: Performed by: ANESTHESIOLOGY

## 2024-08-03 PROCEDURE — 2500000001 HC RX 250 WO HCPCS SELF ADMINISTERED DRUGS (ALT 637 FOR MEDICARE OP)

## 2024-08-03 PROCEDURE — 3700000014 HC AN EPIDURAL BLOCK CHARGE: Performed by: OBSTETRICS & GYNECOLOGY

## 2024-08-03 PROCEDURE — 2500000001 HC RX 250 WO HCPCS SELF ADMINISTERED DRUGS (ALT 637 FOR MEDICARE OP): Performed by: ANESTHESIOLOGY

## 2024-08-03 PROCEDURE — 7100000016 HC LABOR RECOVERY PER HOUR: Performed by: OBSTETRICS & GYNECOLOGY

## 2024-08-03 PROCEDURE — 59514 CESAREAN DELIVERY ONLY: CPT | Performed by: STUDENT IN AN ORGANIZED HEALTH CARE EDUCATION/TRAINING PROGRAM

## 2024-08-03 PROCEDURE — 51701 INSERT BLADDER CATHETER: CPT

## 2024-08-03 PROCEDURE — 7210000002 HC LABOR PER HOUR

## 2024-08-03 PROCEDURE — 51702 INSERT TEMP BLADDER CATH: CPT

## 2024-08-03 PROCEDURE — 2500000004 HC RX 250 GENERAL PHARMACY W/ HCPCS (ALT 636 FOR OP/ED): Performed by: STUDENT IN AN ORGANIZED HEALTH CARE EDUCATION/TRAINING PROGRAM

## 2024-08-03 PROCEDURE — 59050 FETAL MONITOR W/REPORT: CPT

## 2024-08-03 PROCEDURE — 2500000005 HC RX 250 GENERAL PHARMACY W/O HCPCS: Performed by: STUDENT IN AN ORGANIZED HEALTH CARE EDUCATION/TRAINING PROGRAM

## 2024-08-03 PROCEDURE — 2500000004 HC RX 250 GENERAL PHARMACY W/ HCPCS (ALT 636 FOR OP/ED): Performed by: ANESTHESIOLOGY

## 2024-08-03 PROCEDURE — 2500000001 HC RX 250 WO HCPCS SELF ADMINISTERED DRUGS (ALT 637 FOR MEDICARE OP): Performed by: STUDENT IN AN ORGANIZED HEALTH CARE EDUCATION/TRAINING PROGRAM

## 2024-08-03 PROCEDURE — 10H07YZ INSERTION OF OTHER DEVICE INTO PRODUCTS OF CONCEPTION, VIA NATURAL OR ARTIFICIAL OPENING: ICD-10-PCS | Performed by: STUDENT IN AN ORGANIZED HEALTH CARE EDUCATION/TRAINING PROGRAM

## 2024-08-03 PROCEDURE — 2720000007 HC OR 272 NO HCPCS

## 2024-08-03 PROCEDURE — 3700000014 EPIDURAL BLOCK: Performed by: ANESTHESIOLOGY

## 2024-08-03 PROCEDURE — 59514 CESAREAN DELIVERY ONLY: CPT | Performed by: OBSTETRICS & GYNECOLOGY

## 2024-08-03 PROCEDURE — 2500000004 HC RX 250 GENERAL PHARMACY W/ HCPCS (ALT 636 FOR OP/ED)

## 2024-08-03 RX ORDER — SODIUM CITRATE AND CITRIC ACID MONOHYDRATE 334; 500 MG/5ML; MG/5ML
SOLUTION ORAL AS NEEDED
Status: DISCONTINUED | OUTPATIENT
Start: 2024-08-03 | End: 2024-08-03

## 2024-08-03 RX ORDER — AZITHROMYCIN 100 MG/ML
INJECTION, POWDER, LYOPHILIZED, FOR SOLUTION INTRAVENOUS AS NEEDED
Status: DISCONTINUED | OUTPATIENT
Start: 2024-08-03 | End: 2024-08-03

## 2024-08-03 RX ORDER — FENTANYL/BUPIVACAINE/NS/PF 2MCG/ML-.1
PLASTIC BAG, INJECTION (ML) INJECTION AS NEEDED
Status: DISCONTINUED | OUTPATIENT
Start: 2024-08-03 | End: 2024-08-03

## 2024-08-03 RX ORDER — KETOROLAC TROMETHAMINE 30 MG/ML
INJECTION, SOLUTION INTRAMUSCULAR; INTRAVENOUS AS NEEDED
Status: DISCONTINUED | OUTPATIENT
Start: 2024-08-03 | End: 2024-08-03

## 2024-08-03 RX ORDER — LIDOCAINE HYDROCHLORIDE AND EPINEPHRINE 15; 5 MG/ML; UG/ML
INJECTION, SOLUTION EPIDURAL AS NEEDED
Status: DISCONTINUED | OUTPATIENT
Start: 2024-08-03 | End: 2024-08-03

## 2024-08-03 RX ORDER — HYDROCORTISONE ACETATE PRAMOXINE HCL 2.5; 1 G/100G; G/100G
1 CREAM TOPICAL 3 TIMES DAILY
Status: DISCONTINUED | OUTPATIENT
Start: 2024-08-03 | End: 2024-08-07 | Stop reason: HOSPADM

## 2024-08-03 RX ORDER — FAMOTIDINE 10 MG/ML
INJECTION INTRAVENOUS AS NEEDED
Status: DISCONTINUED | OUTPATIENT
Start: 2024-08-03 | End: 2024-08-03

## 2024-08-03 RX ORDER — OXYTOCIN/0.9 % SODIUM CHLORIDE 30/500 ML
2-30 PLASTIC BAG, INJECTION (ML) INTRAVENOUS CONTINUOUS
Status: DISCONTINUED | OUTPATIENT
Start: 2024-08-03 | End: 2024-08-04

## 2024-08-03 RX ORDER — FENTANYL CITRATE 50 UG/ML
INJECTION, SOLUTION INTRAMUSCULAR; INTRAVENOUS AS NEEDED
Status: DISCONTINUED | OUTPATIENT
Start: 2024-08-03 | End: 2024-08-03

## 2024-08-03 RX ORDER — MORPHINE SULFATE 0.5 MG/ML
INJECTION, SOLUTION EPIDURAL; INTRATHECAL; INTRAVENOUS CONTINUOUS PRN
Status: DISCONTINUED | OUTPATIENT
Start: 2024-08-03 | End: 2024-08-03

## 2024-08-03 RX ORDER — FENTANYL/BUPIVACAINE/NS/PF 2MCG/ML-.1
0-54 PLASTIC BAG, INJECTION (ML) INJECTION CONTINUOUS
Status: DISCONTINUED | OUTPATIENT
Start: 2024-08-03 | End: 2024-08-04

## 2024-08-03 RX ORDER — ACETAMINOPHEN 325 MG/1
650 TABLET ORAL EVERY 6 HOURS PRN
Status: DISCONTINUED | OUTPATIENT
Start: 2024-08-03 | End: 2024-08-04

## 2024-08-03 RX ORDER — LIDOCAINE HCL/EPINEPHRINE/PF 2%-1:200K
VIAL (ML) INJECTION AS NEEDED
Status: DISCONTINUED | OUTPATIENT
Start: 2024-08-03 | End: 2024-08-03

## 2024-08-03 RX ORDER — CEFAZOLIN 1 G/1
INJECTION, POWDER, FOR SOLUTION INTRAVENOUS AS NEEDED
Status: DISCONTINUED | OUTPATIENT
Start: 2024-08-03 | End: 2024-08-03

## 2024-08-03 SDOH — HEALTH STABILITY: MENTAL HEALTH: CURRENT SMOKER: 0

## 2024-08-03 ASSESSMENT — PAIN SCALES - GENERAL
PAINLEVEL_OUTOF10: 0 - NO PAIN
PAINLEVEL_OUTOF10: 5 - MODERATE PAIN
PAINLEVEL_OUTOF10: 0 - NO PAIN
PAINLEVEL_OUTOF10: 4
PAINLEVEL_OUTOF10: 3

## 2024-08-03 NOTE — PROGRESS NOTES
This is a medical student note.   Patient seen with Dr. Galarza at ~ 1700.    Intrapartum Progress Note    Assessment/Plan   Bradford Clinton is a 28 y.o.  at 39w2d. HAKEEM: 2024, by Ultrasound.     IOL  Admitted, consented, scanned, cephalic  Cervical exam: /3, unchanged from prior  Pitocin restarted @ 1441  Epidural in place, adequately dosed  GBS negative  EFW ~3000g, ext from 2313 g EFW 9%, AC 17%  FSE in place  FHT Cat 2 for occasional deep variable decels though overall reassuring with moderate variability and + accels  IUPC in place. Plan for amnioinfusion. Will discontinue pitocin if no improvement in FHT.    Delivery plan: patient desires vaginal delivery, patient counseled on risks of labor, vaginal delivery, and possibility of C/S for varying maternal or fetal indications  Discussed with patient that a  section may be indicated if cervix continues to be unchanged and if FHT is unchanged. Patient amenable to this plan.      FGR  EFW 9%ile, AC 17%ile  Weekly BPP with dopplers     Pregnancy notables:   Hx of shoulder dystocia in , relieved with Gualberto, no deficits  Depression, no meds  Anemia, Hgb 11.0 on admission     Postpartum planning:  Contraception: Nexplanon  Feeding: Breastfeeding      ANN VELAZQUEZ, MS-4  Medical Student  CWR[employer]      Principal Problem:    Encounter for induction of labor ([facility])    Pregnancy Problems (from 23 to present)       Problem Noted Resolved    Encounter for induction of labor ([facility]) 2024 by Emma Keller MD No    Priority:  Medium      Pregnancy headache in third trimester ([facility]) 2024 by Anisa Cortes MD No    Priority:  Medium      Ultrasound for  screening for fetal growth restriction ([facility]) 2024 by Emma Keller MD No    Priority:  Medium      Overview Addendum 2024 10:49 AM by Emma Keller MD     FGR last growth US  EFW 2313g (9%), AC 17%, normal dopplers  Weekly BPP with  dopplers  Repeat growth US on 8/1 to determine delivery timing (38 vs 39 weeks)         Grand multiparity in labor and delivery, unspecified trimester ([facility]) 7/16/2024 by Emma Keller MD No    Priority:  Medium      Overview Addendum 7/16/2024 10:49 AM by Emma Keller MD     SVDx5, no hx PPH  Accepts blood products         Depression affecting pregnancy ([facility]) 7/16/2024 by Emma Keller MD No    Priority:  Medium      Overview Signed 7/16/2024 10:50 AM by Emma Keller MD     Patient reports current depression, but managing well on her own without medication           H/O shoulder dystocia in prior pregnancy, currently pregnant ([facility]) 7/16/2024 by Emma Keller MD No    Priority:  Medium      Overview Signed 7/16/2024 10:51 AM by Emma Keller MD     History of shoulder dystocia in 2020, relieved with Gualberto, no deficits         36 weeks gestation of pregnancy ([facility]) 7/16/2024 by Emma Keller MD 7/25/2024 by Anisa Cortes MD            Subjective   Patient comfortable.    Objective   Last Vitals:  Temp Pulse Resp BP MAP Pulse Ox   36.6 °C (97.9 °F) 86 18 115/67 82 100 %     Vitals Min/Max Last 24 Hours:  Temp  Min: 36 °C (96.8 °F)  Max: 37.1 °C (98.8 °F)  Pulse  Min: 66  Max: 158  Resp  Min: 16  Max: 18  BP  Min: 110/58  Max: 141/65  MAP (mmHg)  Min: 78  Max: 102    Intake/Output:    Intake/Output Summary (Last 24 hours) at 8/3/2024 1727  Last data filed at 8/3/2024 1600  Gross per 24 hour   Intake --   Output 1700 ml   Net -1700 ml       Physical Examination:  GENERAL: Examination reveals a well developed, well nourished, gravid female in no acute distress. She is alert and cooperative.  ABDOMEN:  soft, gravid  FHR: *student without access to FHT. To be filled in by resident/attending*  [place] reading: *student without access to [place]. To be filled in by resident/attending*    Lab Review:  Labs in chart were reviewed.

## 2024-08-03 NOTE — PROGRESS NOTES
Patient seen ~ 1430  Late entry due to patient care    Intrapartum Progress Note    Assessment/Plan   Bradford Clinton is a 28 y.o.  at 39w2d. HAKEEM: 2024, by Ultrasound.     IOL  Admitted, consented, scanned, cephalic  Cervical exam: /-3, unchanged from prior  Pitocin currently off  Epidural in place, adequately dosed  GBS negative  EFW ~3000g, ext from 2313 g EFW 9%, AC 17%  IUPC in place  FHT  Delivery plan: patient desires vaginal delivery, patient counseled on risks of labor, vaginal delivery, and possibility of C/S for varying maternal or fetal indications  Plan to restart pitocin per protocol given improvement in contraction pattern and FHT     FGR  EFW 9%ile, AC 17%ile  Weekly BPP with dopplers     Pregnancy notables:   Hx of shoulder dystocia in , relieved with Gualberto, no deficits  Depression, no meds  Anemia, Hgb 11.0 on admission     Postpartum planning:  Contraception: Nexplanon  Feeding: Breastfeeding    Principal Problem:    Encounter for induction of labor ([facility])    Pregnancy Problems (from 23 to present)       Problem Noted Resolved    Encounter for induction of labor ([facility]) 2024 by Emma Keller MD No    Priority:  Medium      Pregnancy headache in third trimester ([facility]) 2024 by Anisa Cortes MD No    Priority:  Medium      Ultrasound for  screening for fetal growth restriction ([facility]) 2024 by Emma Keller MD No    Priority:  Medium      Overview Addendum 2024 10:49 AM by Emma Keller MD     FGR last growth US  EFW 2313g (9%), AC 17%, normal dopplers  Weekly BPP with dopplers  Repeat growth US on  to determine delivery timing (38 vs 39 weeks)         Grand multiparity in labor and delivery, unspecified trimester ([facility]) 2024 by Emma Keller MD No    Priority:  Medium      Overview Addendum 2024 10:49 AM by Emma Keller MD     SVDx5, no hx PPH  Accepts blood products         Depression affecting  pregnancy ([facility]) 7/16/2024 by Emma Keller MD No    Priority:  Medium      Overview Signed 7/16/2024 10:50 AM by Emma Keller MD     Patient reports current depression, but managing well on her own without medication           H/O shoulder dystocia in prior pregnancy, currently pregnant ([facility]) 7/16/2024 by Emma Keller MD No    Priority:  Medium      Overview Signed 7/16/2024 10:51 AM by Emma Keller MD     History of shoulder dystocia in 2020, relieved with Gualberto, no deficits         36 weeks gestation of pregnancy ([facility]) 7/16/2024 by Emma Keller MD 7/25/2024 by Anisa Cortes MD            Subjective   Patient desires for her cervix to be rechecked.     Objective   Last Vitals:  Temp Pulse Resp BP MAP Pulse Ox   36.6 °C (97.9 °F) 86 18 115/67 82 100 %     Vitals Min/Max Last 24 Hours:  Temp  Min: 36 °C (96.8 °F)  Max: 37.1 °C (98.8 °F)  Pulse  Min: 66  Max: 158  Resp  Min: 16  Max: 18  BP  Min: 110/58  Max: 141/65  MAP (mmHg)  Min: 78  Max: 102    Intake/Output:    Intake/Output Summary (Last 24 hours) at 8/3/2024 1652  Last data filed at 8/3/2024 1232  Gross per 24 hour   Intake --   Output 1100 ml   Net -1100 ml       Physical Examination:  GENERAL: Examination reveals a well developed, well nourished, gravid female in no acute distress. She is alert and cooperative.  LUNGS:  No increased work of breathing  HEART:  warm and well perfused  FHR:   [place] reading:     Lab Review:  No new labs    OB Attending Addendum:     Late Entry Note    I saw and evaluated the patient. I personally obtained the key and critical portions of the history and physical exam or was physically present for key and critical portions performed by the resident/fellow. I reviewed the resident/fellow's documentation and discussed the patient with the resident/fellow. I agree with the resident/fellow's medical decision making as documented in the note.    - Patient admitted for IOL for FGR  - Intermittent  cat 2 tracing, always with moderate variability, continue resuscitative efforts PRN  - Hx shoulder dystocia, counseled about recurrence risk previously    Continue induction of labor    Silvia Garcia MD

## 2024-08-03 NOTE — ANESTHESIA PREPROCEDURE EVALUATION
Patient: Bradford Clinton    Evaluation Method: In-person visit    Procedure Information    Date: 08/03/24  Procedure: Labor Analgesia         Relevant Problems   Anesthesia (within normal limits)      Cardiac (within normal limits)      Pulmonary (within normal limits)      Neuro   (+) Depression affecting pregnancy ([facility])      GI (within normal limits)      /Renal (within normal limits)      Liver (within normal limits)      Endocrine (within normal limits)      Hematology (within normal limits)      Skin (within normal limits)      GYN (within normal limits)       Clinical information reviewed:   Tobacco  Allergies  Meds   Med Hx  Surg Hx   Fam Hx          NPO Detail:  NPO/Void Status  Date of Last Liquid: 08/02/24  Time of Last Liquid: 1900  Date of Last Solid: 08/02/24  Time of Last Solid: 1500         OB/Gyn Evaluation    Present Pregnancy    Patient is pregnant now.   Obstetric History      (-) difficult neuraxial anesthesia            Physical Exam    Airway  Mallampati: III  TM distance: >3 FB     Cardiovascular - normal exam     Dental - normal exam     Pulmonary - normal exam     Abdominal   (+) obese           Anesthesia Plan    History of general anesthesia?: yes  History of complications of general anesthesia?: no    ASA 2     epidural     The patient is not a current smoker.    Anesthetic plan and risks discussed with patient.  Use of blood products discussed with patient who.    Plan discussed with resident.

## 2024-08-03 NOTE — ANESTHESIA PROCEDURE NOTES
Epidural Block    Patient location during procedure: OB  Start time: 8/3/2024 6:36 AM  End time: 8/3/2024 7:06 AM  Reason for block: labor analgesia  Staffing  Performed: resident and attending   Authorized by: Medardo Velarde MD    Performed by: Lama Ermias MD    Preanesthetic Checklist  Completed: patient identified, IV checked, risks and benefits discussed, surgical consent, pre-op evaluation, timeout performed and sterile techniques followed  Block Timeout  RN/Licensed healthcare professional reads aloud to the Anesthesia provider and entire team: Patient identity, procedure with side and site, patient position, and as applicable the availability of implants/special equipment/special requirements.  Patient on coagulant treatment: no  Timeout performed at: 8/3/2024 6:36 AM  Block Placement  Patient position: sitting  Prep: ChloraPrep  Sterility prep: cap, gloves, hand and mask  Sedation level: no sedation  Patient monitoring: heart rate, continuous pulse oximetry and blood pressure  Approach: midline  Local numbing: lidocaine 1% to skin and subcutaneous tissues  Vertebral space: lumbar  Lumbar location: L3-L4  Epidural  Loss of resistance technique: saline  Guidance: landmark technique        Needle  Needle type: Tuohy   Needle gauge: 18  Needle length: 11.4 cm  Needle insertion depth: 5.5 cm  Catheter type: multi-orifice  Catheter size: 20 G  Catheter at skin depth: 10.5 cm  Catheter securement method: clear occlusive dressing, liquid medical adhesive and surgical tape    Test dose: lidocaine 1.5% with epinephrine 1-to-200,000  Test dose: lidocaine 1.5% with epinephrine 1-to-200,000  Test dose result: no positive test dose    PCEA  Medication concentration used: 0.044% Bupivacaine with 1.25 mcg/mL Fentanyl and [id number] Epinephrine  Dose (mL): 10  Lockout (minutes): 15  1-Hour Limit (boluses/hr): 4  Basal Rate: 14        Assessment  Sensory level: other (B/L T9) bilateral  Block outcome: pain  improved  Number of attempts: 3 or more (bone encountered, loss but unable to thread)  Events: no positive test dose  Procedure assessment: patient tolerated procedure well with no immediate complications  Additional Notes  Multiple attempts by resident, Dr. Velarde prior to my arrival.  I placed easily on my first attempt.  LBL

## 2024-08-03 NOTE — H&P
Obstetrical Admission History and Physical  Assessment/Plan    Bradford Clinton is a 28 y.o.  at 39w1d, HAKEEM: 2024, by Ultrasound admitted for scheduled IOL in the setting of FGR.    IOL  Admitted, consented, scanned, cephalic  Will induce with CRB, Pit  Epidural when requested  GBS negative  EFW ~3000g, ext from 2313 g EFW 9%, AC 17%  CEFM, currently Cat I  Delivery plan: patient desires vaginal delivery, patient counseled on risks of labor, vaginal delivery, and possibility of C/S for varying maternal or fetal indications    FGR  EFW 9%ile, AC 17%ile  Weekly BPP with dopplers    Pregnancy notables:   Hx of shoulder dystocia in , relieved with Gualberto, no deficits  Depression, no meds  Anemia, Hgb 11.0 on admission    Postpartum planning:  Contraception: Nexplanon  Feeding: Breastfeeding    Patient seen and discussed with Dr. Leon French MD  OBGYN    Subjective   Braswell is feeling overwhelmed but excited to meet baby Keyvon.  Denies headache, CP, SOB, or other concerns.  Feels Ctx intermittently, no LOF, no changes in vaginal discharge.  Reports some bright red blood on toilet paper when wiping at around 2000pm.    Requesting hydrocortisone cream 2.5% for eczema     Reports that this baby feels really big compared to others, but was at a heavier weight during other pregnancies.  Reports pushing for 2 minutes in last delivery.     Hx  x5 wtihout PPH  P3 with shoulder dystocia, no residual deficits  P4 FGR, ~5 lbs at birth    Obstetrical History   OB History    Para Term  AB Living   6 5 5 0 0 5   SAB IAB Ectopic Multiple Live Births   0 0 0 0 5      # Outcome Date GA Lbr Michael/2nd Weight Sex Type Anes PTL Lv   6 Current            5 Term  38w0d    Vag-Spont         Complications: Fetal growth restriction antepartum ([facility])   4 Term  38w0d  2.637 kg  Vag-Spont         Complications: Fetal growth restriction antepartum ([facility])   3 Term    3.09 kg  Vag-Spont     "     Birth Comments: Relieved with Gualberto, no deficits      Complications: Shoulder Dystocia   2 Term 2018 39w0d  2.778 kg  Vag-Spont   KETAN   1 Term 2013 39w0d  3.033 kg  Vag-Spont          Past Medical History  Past Medical History:   Diagnosis Date    Depression     Other conditions influencing health status 08/11/2021    No significant past medical history      Past Surgical History   Past Surgical History:   Procedure Laterality Date    OTHER SURGICAL HISTORY  12/17/2019    No history of surgery    WRIST SURGERY  2022    \"rods placed after I broke my wrist\"     Social History  Social History     Tobacco Use    Smoking status: Never    Smokeless tobacco: Never   Substance Use Topics    Alcohol use: Not Currently     Substance and Sexual Activity   Drug Use Not Currently    Types: Marijuana    Comment: used for sleep; quit two years ago       Allergies  Ibuprofen, Iodine, Shellfish containing products, and Shellfish derived     Medications  Medications Prior to Admission   Medication Sig Dispense Refill Last Dose    acetaminophen (TylenoL) 325 mg tablet Take 3 tablets (975 mg) by mouth every 6 hours if needed for mild pain (1 - 3) or fever (temp greater than 38.0 C). 30 tablet 0 8/1/2024    cetirizine (ZyrTEC) 10 mg tablet Take 1 tablet (10 mg) by mouth once daily. 30 tablet 11 8/2/2024    loratadine (Claritin) 10 mg tablet Take 1 tablet (10 mg) by mouth once daily.   Past Month    magnesium oxide (Mag-Ox) 400 mg (241.3 mg magnesium) tablet Take 1 tablet (400 mg) by mouth once daily at bedtime. 30 tablet 11 8/1/2024    triamcinolone (Nasacort) 55 mcg nasal inhaler Administer 2 sprays into each nostril once daily. 16.5 g 11 8/2/2024    aspirin 81 mg EC tablet Take 1 tablet (81 mg) by mouth once daily.   More than a month       Objective    Last Vitals  Temp Pulse Resp BP MAP O2 Sat   36.7 °C (98.1 °F) 88 18 128/69   99 %     Physical Examination  General: no acute distress  HEENT: normocephalic, " atraumatic  Heart: warm and well perfused  Lungs: breathing comfortably on room air  Abdomen: gravid  Extremities: moving all extremities  Neuro: awake and conversant  Psych: appropriate mood and affect    SVE: 1/50/-3  FHT: 135/mod/+accel/-decel  [place]: q 5 minutes    BSUS:   Position: Cephalic     Lab Review  Labs in chart have been reviewed.    Lab Results   Component Value Date    WBC 7.1 08/02/2024    HGB 11.0 (L) 08/02/2024    HCT 33.1 (L) 08/02/2024     08/02/2024    ALT 11 07/07/2023    AST 10 07/07/2023     07/07/2023    K 4.1 07/07/2023     07/07/2023    CREATININE 0.53 07/07/2023    BUN 10 07/07/2023    CO2 24 07/07/2023    HGBA1C 5.2 01/02/2020     Lab Results   Component Value Date    HIV1X2 NONREACTIVE 11/18/2021    CHLAMTRACAMP NEGATIVE 09/22/2021    NEISSGONOAMP NEGATIVE 09/22/2021    SYPHT NONREACTIVE 08/14/2023

## 2024-08-04 VITALS
SYSTOLIC BLOOD PRESSURE: 113 MMHG | WEIGHT: 224.87 LBS | HEIGHT: 64 IN | BODY MASS INDEX: 38.39 KG/M2 | DIASTOLIC BLOOD PRESSURE: 67 MMHG | OXYGEN SATURATION: 98 % | RESPIRATION RATE: 18 BRPM | HEART RATE: 85 BPM | TEMPERATURE: 97.3 F

## 2024-08-04 LAB
ERYTHROCYTE [DISTWIDTH] IN BLOOD BY AUTOMATED COUNT: 14.4 % (ref 11.5–14.5)
HCT VFR BLD AUTO: 25.3 % (ref 36–46)
HGB BLD-MCNC: 8.4 G/DL (ref 12–16)
MCH RBC QN AUTO: 29.8 PG (ref 26–34)
MCHC RBC AUTO-ENTMCNC: 33.2 G/DL (ref 32–36)
MCV RBC AUTO: 90 FL (ref 80–100)
NRBC BLD-RTO: 0 /100 WBCS (ref 0–0)
PLATELET # BLD AUTO: 124 X10*3/UL (ref 150–450)
RBC # BLD AUTO: 2.82 X10*6/UL (ref 4–5.2)
WBC # BLD AUTO: 8.5 X10*3/UL (ref 4.4–11.3)

## 2024-08-04 PROCEDURE — 2500000004 HC RX 250 GENERAL PHARMACY W/ HCPCS (ALT 636 FOR OP/ED): Performed by: STUDENT IN AN ORGANIZED HEALTH CARE EDUCATION/TRAINING PROGRAM

## 2024-08-04 PROCEDURE — 2500000001 HC RX 250 WO HCPCS SELF ADMINISTERED DRUGS (ALT 637 FOR MEDICARE OP)

## 2024-08-04 PROCEDURE — 2500000002 HC RX 250 W HCPCS SELF ADMINISTERED DRUGS (ALT 637 FOR MEDICARE OP, ALT 636 FOR OP/ED): Performed by: STUDENT IN AN ORGANIZED HEALTH CARE EDUCATION/TRAINING PROGRAM

## 2024-08-04 PROCEDURE — 2500000001 HC RX 250 WO HCPCS SELF ADMINISTERED DRUGS (ALT 637 FOR MEDICARE OP): Performed by: STUDENT IN AN ORGANIZED HEALTH CARE EDUCATION/TRAINING PROGRAM

## 2024-08-04 PROCEDURE — 1220000001 HC OB SEMI-PRIVATE ROOM DAILY

## 2024-08-04 PROCEDURE — 85027 COMPLETE CBC AUTOMATED: CPT | Performed by: STUDENT IN AN ORGANIZED HEALTH CARE EDUCATION/TRAINING PROGRAM

## 2024-08-04 PROCEDURE — 36415 COLL VENOUS BLD VENIPUNCTURE: CPT | Performed by: STUDENT IN AN ORGANIZED HEALTH CARE EDUCATION/TRAINING PROGRAM

## 2024-08-04 PROCEDURE — 7100000016 HC LABOR RECOVERY PER HOUR

## 2024-08-04 PROCEDURE — 88307 TISSUE EXAM BY PATHOLOGIST: CPT | Mod: TC,SUR

## 2024-08-04 PROCEDURE — 1210000001 HC SEMI-PRIVATE ROOM DAILY

## 2024-08-04 RX ORDER — POLYETHYLENE GLYCOL 3350 17 G/17G
17 POWDER, FOR SOLUTION ORAL 2 TIMES DAILY PRN
Status: DISCONTINUED | OUTPATIENT
Start: 2024-08-04 | End: 2024-08-07 | Stop reason: HOSPADM

## 2024-08-04 RX ORDER — DIPHENHYDRAMINE HCL 25 MG
25 CAPSULE ORAL EVERY 6 HOURS PRN
Status: DISCONTINUED | OUTPATIENT
Start: 2024-08-04 | End: 2024-08-07 | Stop reason: HOSPADM

## 2024-08-04 RX ORDER — IBUPROFEN 600 MG/1
600 TABLET ORAL EVERY 6 HOURS
Status: DISCONTINUED | OUTPATIENT
Start: 2024-08-05 | End: 2024-08-04

## 2024-08-04 RX ORDER — SIMETHICONE 80 MG
80 TABLET,CHEWABLE ORAL 4 TIMES DAILY PRN
Status: DISCONTINUED | OUTPATIENT
Start: 2024-08-04 | End: 2024-08-07 | Stop reason: HOSPADM

## 2024-08-04 RX ORDER — LIDOCAINE 560 MG/1
1 PATCH PERCUTANEOUS; TOPICAL; TRANSDERMAL
Status: DISCONTINUED | OUTPATIENT
Start: 2024-08-04 | End: 2024-08-07 | Stop reason: HOSPADM

## 2024-08-04 RX ORDER — LOPERAMIDE HYDROCHLORIDE 2 MG/1
4 CAPSULE ORAL EVERY 2 HOUR PRN
Status: DISCONTINUED | OUTPATIENT
Start: 2024-08-04 | End: 2024-08-07 | Stop reason: HOSPADM

## 2024-08-04 RX ORDER — CARBOPROST TROMETHAMINE 250 UG/ML
250 INJECTION, SOLUTION INTRAMUSCULAR ONCE AS NEEDED
Status: DISCONTINUED | OUTPATIENT
Start: 2024-08-04 | End: 2024-08-07 | Stop reason: HOSPADM

## 2024-08-04 RX ORDER — ADHESIVE BANDAGE
10 BANDAGE TOPICAL
Status: DISCONTINUED | OUTPATIENT
Start: 2024-08-04 | End: 2024-08-07 | Stop reason: HOSPADM

## 2024-08-04 RX ORDER — MISOPROSTOL 200 UG/1
800 TABLET ORAL ONCE AS NEEDED
Status: DISCONTINUED | OUTPATIENT
Start: 2024-08-04 | End: 2024-08-07 | Stop reason: HOSPADM

## 2024-08-04 RX ORDER — HYDRALAZINE HYDROCHLORIDE 20 MG/ML
5 INJECTION INTRAMUSCULAR; INTRAVENOUS ONCE AS NEEDED
Status: DISCONTINUED | OUTPATIENT
Start: 2024-08-04 | End: 2024-08-07 | Stop reason: HOSPADM

## 2024-08-04 RX ORDER — HYDROMORPHONE HYDROCHLORIDE 1 MG/ML
0.2 INJECTION, SOLUTION INTRAMUSCULAR; INTRAVENOUS; SUBCUTANEOUS EVERY 5 MIN PRN
Status: DISCONTINUED | OUTPATIENT
Start: 2024-08-04 | End: 2024-08-07 | Stop reason: HOSPADM

## 2024-08-04 RX ORDER — ENOXAPARIN SODIUM 100 MG/ML
40 INJECTION SUBCUTANEOUS EVERY 24 HOURS
Status: DISCONTINUED | OUTPATIENT
Start: 2024-08-04 | End: 2024-08-07 | Stop reason: HOSPADM

## 2024-08-04 RX ORDER — ONDANSETRON 4 MG/1
4 TABLET, FILM COATED ORAL EVERY 6 HOURS PRN
Status: DISCONTINUED | OUTPATIENT
Start: 2024-08-04 | End: 2024-08-07 | Stop reason: HOSPADM

## 2024-08-04 RX ORDER — OXYCODONE HYDROCHLORIDE 5 MG/1
10 TABLET ORAL EVERY 4 HOURS PRN
Status: DISCONTINUED | OUTPATIENT
Start: 2024-08-05 | End: 2024-08-07 | Stop reason: HOSPADM

## 2024-08-04 RX ORDER — BISACODYL 10 MG/1
10 SUPPOSITORY RECTAL DAILY PRN
Status: DISCONTINUED | OUTPATIENT
Start: 2024-08-04 | End: 2024-08-07 | Stop reason: HOSPADM

## 2024-08-04 RX ORDER — TRANEXAMIC ACID 100 MG/ML
1000 INJECTION, SOLUTION INTRAVENOUS ONCE AS NEEDED
Status: DISCONTINUED | OUTPATIENT
Start: 2024-08-04 | End: 2024-08-07 | Stop reason: HOSPADM

## 2024-08-04 RX ORDER — ONDANSETRON HYDROCHLORIDE 2 MG/ML
4 INJECTION, SOLUTION INTRAVENOUS EVERY 6 HOURS PRN
Status: DISCONTINUED | OUTPATIENT
Start: 2024-08-04 | End: 2024-08-07 | Stop reason: HOSPADM

## 2024-08-04 RX ORDER — DIPHENHYDRAMINE HYDROCHLORIDE 50 MG/ML
25 INJECTION INTRAMUSCULAR; INTRAVENOUS EVERY 4 HOURS PRN
Status: DISCONTINUED | OUTPATIENT
Start: 2024-08-04 | End: 2024-08-07 | Stop reason: HOSPADM

## 2024-08-04 RX ORDER — LABETALOL HYDROCHLORIDE 5 MG/ML
20 INJECTION, SOLUTION INTRAVENOUS ONCE AS NEEDED
Status: DISCONTINUED | OUTPATIENT
Start: 2024-08-04 | End: 2024-08-07 | Stop reason: HOSPADM

## 2024-08-04 RX ORDER — ACETAMINOPHEN 325 MG/1
975 TABLET ORAL EVERY 6 HOURS
Status: DISCONTINUED | OUTPATIENT
Start: 2024-08-04 | End: 2024-08-07 | Stop reason: HOSPADM

## 2024-08-04 RX ORDER — HYDROMORPHONE HYDROCHLORIDE 1 MG/ML
0.2 INJECTION, SOLUTION INTRAMUSCULAR; INTRAVENOUS; SUBCUTANEOUS EVERY 5 MIN PRN
Status: DISCONTINUED | OUTPATIENT
Start: 2024-08-04 | End: 2024-08-04 | Stop reason: HOSPADM

## 2024-08-04 RX ORDER — METHYLERGONOVINE MALEATE 0.2 MG/ML
0.2 INJECTION INTRAVENOUS ONCE AS NEEDED
Status: DISCONTINUED | OUTPATIENT
Start: 2024-08-04 | End: 2024-08-07 | Stop reason: HOSPADM

## 2024-08-04 RX ORDER — NIFEDIPINE 10 MG/1
10 CAPSULE ORAL ONCE AS NEEDED
Status: DISCONTINUED | OUTPATIENT
Start: 2024-08-04 | End: 2024-08-07 | Stop reason: HOSPADM

## 2024-08-04 RX ORDER — DIPHENHYDRAMINE HCL 25 MG
25 CAPSULE ORAL EVERY 4 HOURS PRN
Status: DISCONTINUED | OUTPATIENT
Start: 2024-08-04 | End: 2024-08-07 | Stop reason: HOSPADM

## 2024-08-04 RX ORDER — OXYTOCIN/0.9 % SODIUM CHLORIDE 30/500 ML
60 PLASTIC BAG, INJECTION (ML) INTRAVENOUS ONCE AS NEEDED
Status: DISCONTINUED | OUTPATIENT
Start: 2024-08-04 | End: 2024-08-07 | Stop reason: HOSPADM

## 2024-08-04 RX ORDER — NALOXONE HYDROCHLORIDE 0.4 MG/ML
0.1 INJECTION, SOLUTION INTRAMUSCULAR; INTRAVENOUS; SUBCUTANEOUS EVERY 5 MIN PRN
Status: DISCONTINUED | OUTPATIENT
Start: 2024-08-04 | End: 2024-08-07 | Stop reason: HOSPADM

## 2024-08-04 RX ORDER — KETOROLAC TROMETHAMINE 30 MG/ML
30 INJECTION, SOLUTION INTRAMUSCULAR; INTRAVENOUS EVERY 6 HOURS
Status: COMPLETED | OUTPATIENT
Start: 2024-08-04 | End: 2024-08-04

## 2024-08-04 RX ORDER — OXYTOCIN 10 [USP'U]/ML
10 INJECTION, SOLUTION INTRAMUSCULAR; INTRAVENOUS ONCE AS NEEDED
Status: DISCONTINUED | OUTPATIENT
Start: 2024-08-04 | End: 2024-08-07 | Stop reason: HOSPADM

## 2024-08-04 RX ORDER — OXYCODONE HYDROCHLORIDE 5 MG/1
5 TABLET ORAL EVERY 4 HOURS PRN
Status: DISCONTINUED | OUTPATIENT
Start: 2024-08-05 | End: 2024-08-07 | Stop reason: HOSPADM

## 2024-08-04 ASSESSMENT — PAIN SCALES - GENERAL
PAINLEVEL_OUTOF10: 0 - NO PAIN
PAINLEVEL_OUTOF10: 7
PAINLEVEL_OUTOF10: 7
PAINLEVEL_OUTOF10: 6
PAINLEVEL_OUTOF10: 2
PAINLEVEL_OUTOF10: 6
PAINLEVEL_OUTOF10: 8

## 2024-08-04 ASSESSMENT — PAIN DESCRIPTION - DESCRIPTORS: DESCRIPTORS: DISCOMFORT;SORE

## 2024-08-04 ASSESSMENT — PAIN DESCRIPTION - LOCATION
LOCATION: INCISION
LOCATION: INCISION
LOCATION: ABDOMEN
LOCATION: INCISION

## 2024-08-04 NOTE — PROGRESS NOTES
Intrapartum Progress Note    Assessment/Plan   Bradford Clinton is a 28 y.o.  at 39w2d, HAKEEM: 2024, by Ultrasound admitted for IOL in the setting of FGR    IOL  Pitocin off for recurrent lates, unable to turn on on this shift  Pain management: Epidural infusing  CEFM, currently Category II, pt originally refusing positional changes but now repositioning with improvement in baseline. With IUPC and amnioinfusion. Will discuss c/s.  GBS: negative  Next exam: as clinically indicated    FGR  EFW 9%ile, AC 17%ile  Weekly BPP with doppler    Maternal conditions  Hx of shoulder dystocia in , relieved with Gualberto, no deficits  Depression, no meds  Anemia, Hgb 11.0 on admission    Seen and discussed with Dr. Maxime French MD  OBGYN    Subjective   Braswell is feeling okay    Objective   Last Vitals:  Temp Pulse Resp BP MAP Pulse Ox   36.4 °C (97.5 °F) 74 20 124/72   100 %     Vitals Min/Max Last 24 Hours:  Temp  Min: 36 °C (96.8 °F)  Max: 37.1 °C (98.8 °F)  Pulse  Min: 65  Max: 158  Resp  Min: 16  Max: 20  BP  Min: 108/54  Max: 141/65    Intake/Output:    Intake/Output Summary (Last 24 hours) at 8/3/2024 2151  Last data filed at 8/3/2024 2041  Gross per 24 hour   Intake --   Output 2500 ml   Net -2500 ml       Physical Examination:  General: no acute distress  HEENT: normocephalic, atraumatic  Heart: warm and well perfused  Lungs: breathing comfortably on room air  Abdomen: gravid  Extremities: moving all extremities  Neuro: awake and conversant  Psych: appropriate mood and affect    SVE: 50/-3  FHT: 120/mod/+accel/-decel  [place]: q4min    Lab Review:  Labs in chart have been reviewed    Results from last 7 days   Lab Units 24  2223   HEMOGLOBIN g/dL 11.0*   HEMATOCRIT % 33.1*   PLATELETS AUTO x10*3/uL 189

## 2024-08-04 NOTE — DISCHARGE INSTRUCTIONS

## 2024-08-04 NOTE — L&D DELIVERY NOTE
OB Delivery Note  2024  Bradford Clinton  28 y.o.   , Low Transverse       Gestational Age: 39w2d  /Para:   Quantitative Blood Loss: Admission to Discharge: 1530 mL (2024  7:32 PM - 2024  3:24 AM)    Indication: 29yo  undergoing IOL at 39.2wga in the setting of FGR with recurrent late decelerations and periods of fetal bradycardia with prolonged deceleration. Decision made to move back to the OR with urgent CS.    Pre-op diagnosis: IUP at 39.2wga, fetal growth restriction, nonreassuring fetal heart tones, grand multiparity, class III obesity (BMI 40)    Post op diagnosis: Same, malposition of occiput transverse with asynclitism.    Findings: Normal appearing gravid uterus, fallopian tubes, and ovaries. Amniotic fluid clear, infant in cephalic presentation.    Procedure: Patient was taken to the OR where epidural anesthesia was redosed. She was then placed in the dorsal supine position with a left lateral tilt. A lawton catheter was placed, SCDs were applied, and a vaginal prep was performed. A pre-procedure time out was performed.  The patient was given prophylactic dose of IV antibiotics. She was then prepped and draped in the usual sterile fashion.     A Pfannenstiel skin incision was made through the skin with the scalpel and then carried through the subcutaneous tissue to the underlying fascial layer with sharp dissection. The fascia was incised on either side of the midline with the scalpel, and fascia was then dissected off the rectus muscle bilaterally bluntly. The muscles were divided in the midline, the peritoneum was identified and then entered bluntly, and incision extended superiorly and inferiorly with good visualization of bladder below. Bladder blade was inserted.     A low transverse uterine incision was made with the scalpel, the uterine cavity was entered, and the hysterotomy was extended bilaterally with cephalocaudal stretching. The infant was delivered  atraumatically, the cord was clamped and cut, and infant was handed off to the awaiting nursing staff. A cord segment and blood sample were collected. The placenta was then expressed. The uterus was exteriorized and cleared of all clot and debris. The uterine incision was repaired using a running stitch of 0-Vicryl. A second layer of the same suture was placed in an imbricating fashion. Two additional figure of eight sutures and electrosurgery were used over the left corner of the hysterotomy for hemostasis. Good hemostasis was noted.     The uterus was then placed back inside the pelvis, the gutters were cleared of all clots and debris. The hysterotomy was again evaluated and found to be hemostatic, and the underside of the fascia and bladder and the rectus muscles were also found to be hemostatic. The fascia was closed using two running sutures of 0-PDS, meeting in the middle. The subcutaneous layer was irrigated, small bleeding vessels were cauterized. The skin was closed with 4-0 Vicryl.  All counts were correct, the patient tolerated the procedure well. Antibiotics were redosed at the end of the procedure given QBL >1500mL. Dr. Swartz was present for all key portions of the procedure. The patient was taken back to the recovery room in stable condition.    Jn Clinton [[id number]]      Labor Events    Sac identifier: Sac 1  Rupture date/time: 8/3/2024 0904  Rupture type: Artificial  Fluid color: Clear  Labor type: Induced Onset of Labor  Labor allowed to proceed with plans for an attempted vaginal birth?: Yes  Induction: Oxytocin, Zayas/EASI, AROM  First cervical ripening date/time: 8/3/2024 0030  Induction date/time: 8/3/2024 0030  Induction indications: Risk Reducing  Complications: Fetal Intolerance       Labor Event Times    Labor onset date/time: 8/3/2024 0030  Decision date/time (emergent ): 8/3/2024 2216       Labor Length    3rd stage: 0h 02m       Placenta    Placenta delivery date/time:  8/3/2024 2238  Placenta removal: Expressed  Placenta appearance: Intact  Placenta disposition: pathology       Cord    Vessels: 3 vessels  Complications: None  Delayed cord clamping?: Yes  Cord clamped date/time: 8/3/2024 23:37:00  Cord blood disposition: Lab  Gases sent?: Yes  Stem cell collection (by provider): No       Lacerations    Episiotomy: None  Perineal laceration: None  Other lacerations?: No  Repair suture: None       Anesthesia    Method: Epidural, Combined spinal-epidural       Operative Delivery    Forceps attempted?: No  Vacuum extractor attempted?: No       Shoulder Dystocia    Shoulder dystocia present?: No        Delivery    Birth date/time: 8/3/2024 22:36:00  Delivery type: , Low Transverse   categorization: primary   priority: urgent  Indications for : Fetal Intolerance of Labor  Incision type: low transverse  Complications: Fetal Intolerance       Resuscitation    Method: Tactile stimulation, Suctioning       Apgars    Living status: Living  Apgar Component Scores:  1 min.:  5 min.:  10 min.:  15 min.:  20 min.:    Skin color:  0  1       Heart rate:  2  2       Reflex irritability:  2  2       Muscle tone:  2  2       Respiratory effort:  2  2       Total:  8  9       Apgars assigned by: SETH VALVERDE       Delivery Providers    Delivering clinician: Kezia Swartz MD   Provider Role    Nitza Estrada RN Delivery Nurse    Mera Valverde RN Nursery Nurse    Emma Keller MD Resident                 Emma Keller MD

## 2024-08-04 NOTE — SIGNIFICANT EVENT
Decision for  - late entry for ~2200  Noted FHT changes when I came out of emergent care on the floor.    Exam done, no change in cervix, still remote from delivery.  Discussed limitations of FHT, but without objective evidence to ensure fetal well-being.  Patient hesitant to proceed with  as partner not immediately available (had left the hospital for a brief trip home).  Reviewed recommendation to move quickly to  as the FHT decels became more prolonged, with nadirs in the 70s.    Patient amenable to moving back to the OR.    All questions/concerns addressed  Reviewed risks of surgery.    Nursing and anesthesia team notified of urgent .   Kezia Swartz MD

## 2024-08-04 NOTE — ANESTHESIA POSTPROCEDURE EVALUATION
Patient: Bradford Clinton    Procedure Summary       Date: 24 Room / Location: [facility] 02 / [facility] OB    Anesthesia Start: 636 Anesthesia Stop:     Procedure: OBGYN Delivery  Section Diagnosis: (Fetal Intolerance of Labor)    Surgeons: Kezia Swartz MD Responsible Provider: Nadira Mcduffie MD    Anesthesia Type: epidural ASA Status: 2          Bradford Clinton is a 28 y.o., , who had a , Low Transverse delivery on 8/3/2024 at 39w2d and is now POD1.    She had Neuraxial Anesthesia without immediate complications noted.       Pain well controlled    Vitals:    24 0703   BP:    Pulse: 69   Resp: 17   Temp:    SpO2: 100%       Neuraxial site assessed. No visible redness or swelling or drainage. Patient able to ambulate and move all extremities without difficulty. Able to void. No complaints of nausea/vomiting. Tolerating PO intake well. No s/sx of PDPH.     Anesthesia will sign off     Jose Rafael Brito MD      Anesthesia Type: epidural

## 2024-08-04 NOTE — LACTATION NOTE
Lactation Consultant Note  Lactation Consultation  Reason for Consult: Initial assessment  Consultant Name: Zenia Davenport RN, IBCLC    Maternal Information  Has mother  before?: Yes  How long did the mother previously breastfeed?: least ampunt of time was 7 weeks (last child) but, other children for 2 years  Previous Maternal Breastfeeding Challenges: Other (Comment), Difficult latch, Low milk supply (difficulty with latching  and low milk supply with her last child.)  Infant to breast within first 2 hours of birth?: Yes  Exclusive Pump and Bottle Feed: No    Maternal Assessment  Breast Assessment: Large, Symmetrical, Soft, Compressible, Other (Comment) (expressible)  Nipple Assessment: Intact, Erect  Areola Assessment: Normal    Infant Assessment  Infant Behavior: Feeding cues observed, Readiness to feed, Suckles on and off, needs stimulation  Infant Assessment: Other (Comment) (did not assess- mom was already latching the baby)    Feeding Assessment  Nutrition Source: Breastmilk  Feeding Method: Nursing at the breast  Feeding Position: Baby led, Cradle, One side per feeding, Mother demonstrates good positioning, Other (Comment) (needed slightly deeper latch)  Suck/Feeding: Sustained, Coordinated suck/swallow/breathe, Audible swallowing with stimulaton  Latch Assessment: Minimal assistance is needed, Instructed on deep latch, Areolar attachment, Deep latch obtained, Optimal angle of mouth opening, Comfortable with no pain, Comfortable latch    LATCH TOOL  Latch: Repeated attempts, hold nipple in mouth, stimulate to suck  Audible Swallowing: Spontaneous and intermittent (24 hours old)  Type of Nipple: Everted (After stimulation)  Comfort (Breast/Nipple): Soft/non-tender  Hold (Positioning): Minimal assist, teach one side, mother does other, staff holds  LATCH Score: 8    Breast Pump       Other OB Lactation Tools       Patient Follow-up  Outpatient Lactation Follow-up: Recommended    Other OB  Lactation Documentation  Maternal Risk Factors: Other (comment) (hx of deperession)  Infant Risk Factors: Early term birth 37-39 weeks    Recommendations/Summary  Observed mom latching the baby to the breast. Minimal assistance provided to achieve a deeper latch and baby needed better body alignment. Mom stated the latch was comfortable and noted swallows.     Reviewed feeding cues, the normal feeding patterns in the first 24 hours of life, the role of colostrum, output on different days of life, milk production/ supply in the first few days of life, and the benefits of skin to skin.      Encouraged mom to breastfeed on demand with a goal of 8-12 feeds in a 24 hour period.   If baby is not showing feeding cues and it has been 3 hours since the last time the baby was fed or the last time the baby assistance to feed, encouraged her to place baby in skin to skin.       Mom received a breast pump last year with her last child and she doesn't qualify for a new one (same insurance). She requested a hand pump. I gave her a [email or web address] hand pump - her nipples measure to be 22 MM- advised her to use 24 MM flange when pumping.     Encouraged her to utilize the outpatient lactation resources, if needed.   Contact information given.   [phone] [facility] or [phone] [place]

## 2024-08-04 NOTE — PROGRESS NOTES
Postpartum Progress Note    Assessment/Plan   Bradford Clinton is a 28 y.o., , who was admitted for IOL in s/o FGR, delivered at 39w2d gestation and is now postpartum day 1 s/p PLTCS for NRFHT.     Routine postpartum care  - meeting all postpartum and post-op milestones  - voiding spontaneously, passing flatus, ambulating, tolerating PO diet  - bonding with male infant  - pain well controlled on po medications  - DVT Score: 8 - encourage ambulation,  SCDs, and  ppx lovenox  - O+, Rhogam not indicated  - PPBC: Nexplanon    Acute Blood Loss Anemia  - admission hgb 11.0 -> EBL 1530 mL -> POD#1 8.4  - asymptomatic  - PO Fe at DC     Maternal Well-Being  - emotional support provided     Feeding  - breastfeeding/pumping encouraged; lactation consult prn    Dispo:  anticipate d/c on POD #3 if meeting all postpartum milestones, for f/u 2 weeks for incision check and 1 month with Primary OB provider    ANITA Louie    Principal Problem:    Encounter for induction of labor ([facility])    Pregnancy Problems (from 23 to present)       Problem Noted Resolved    Encounter for induction of labor ([facility]) 2024 by Emma Keller MD No    Priority:  Medium      Pregnancy headache in third trimester ([facility]) 2024 by Anisa Cortes MD No    Priority:  Medium      Ultrasound for  screening for fetal growth restriction ([facility]) 2024 by Emma Keller MD No    Priority:  Medium      Overview Addendum 2024 10:49 AM by Emma Keller MD     FGR last growth US  EFW 2313g (9%), AC 17%, normal dopplers  Weekly BPP with dopplers  Repeat growth US on  to determine delivery timing (38 vs 39 weeks)         Grand multiparity in labor and delivery, unspecified trimester ([facility]) 2024 by Emma Keller MD No    Priority:  Medium      Overview Addendum 2024 10:49 AM by Emma Keller MD     SVDx5, no hx PPH  Accepts blood products         Depression affecting  pregnancy ([facility]) 7/16/2024 by Emma Keller MD No    Priority:  Medium      Overview Signed 7/16/2024 10:50 AM by Emma Keller MD     Patient reports current depression, but managing well on her own without medication           H/O shoulder dystocia in prior pregnancy, currently pregnant ([facility]) 7/16/2024 by Emma Keller MD No    Priority:  Medium      Overview Signed 7/16/2024 10:51 AM by Emma Keller MD     History of shoulder dystocia in 2020, relieved with Gualberto, no deficits         36 weeks gestation of pregnancy ([facility]) 7/16/2024 by Emma Keller MD 7/25/2024 by Anisa Cortes MD            Subjective   Her pain is well controlled with current medications  She is passing flatus  She is ambulating well  She is tolerating a Adult diet Regular  She reports no breast or nursing problems  She denies emotional concerns today      Denies HA, vision changes, RUQ pain, chest pain, or SOB.    Objective   Allergies:   Ibuprofen, Iodine, Shellfish containing products, and Shellfish derived         Last Vitals:  Temp Pulse Resp BP MAP Pulse Ox   36.4 °C (97.5 °F) 82 20 108/68   98 %     Vitals Min/Max Last 24 Hours:  Temp  Min: 35.7 °C (96.3 °F)  Max: 37.1 °C (98.8 °F)  Pulse  Min: 59  Max: 103  Resp  Min: 16  Max: 20  BP  Min: 100/58  Max: 127/62    Intake/Output:     Intake/Output Summary (Last 24 hours) at 8/4/2024 1807  Last data filed at 8/4/2024 0801  Gross per 24 hour   Intake 1660 ml   Output 4790 ml   Net -3130 ml       Physical Exam:  General: well appearing, well-nourished, postpartum  Obstetric: abdomen soft/non-tender, fundus firm below umbilicus, lochia light, Pfannenstiel incision c/d/i  Skin: No rashes/lesions/erythema  Neuro: A/Ox3, conversational  GI: +BS, +flatus  Respiratory: Even and unlabored on RA, LSCTA BL  Cardiovascular: RRR, normal S1, S2  Extremities: No edema, discoloration, or pain in BLE, equal and palpable DP and PT pulses    Psych: appropriate mood and affect      Lab Data:  Lab Results   Component Value Date    WBC 8.5 08/04/2024    HGB 8.4 (L) 08/04/2024    HCT 25.3 (L) 08/04/2024     (L) 08/04/2024

## 2024-08-05 LAB
ERYTHROCYTE [DISTWIDTH] IN BLOOD BY AUTOMATED COUNT: 14 % (ref 11.5–14.5)
HCT VFR BLD AUTO: 25.2 % (ref 36–46)
HGB BLD-MCNC: 8.5 G/DL (ref 12–16)
MCH RBC QN AUTO: 29.9 PG (ref 26–34)
MCHC RBC AUTO-ENTMCNC: 33.7 G/DL (ref 32–36)
MCV RBC AUTO: 89 FL (ref 80–100)
NRBC BLD-RTO: 0 /100 WBCS (ref 0–0)
PLATELET # BLD AUTO: 178 X10*3/UL (ref 150–450)
RBC # BLD AUTO: 2.84 X10*6/UL (ref 4–5.2)
WBC # BLD AUTO: 7.9 X10*3/UL (ref 4.4–11.3)

## 2024-08-05 PROCEDURE — 1210000001 HC SEMI-PRIVATE ROOM DAILY

## 2024-08-05 PROCEDURE — 2500000001 HC RX 250 WO HCPCS SELF ADMINISTERED DRUGS (ALT 637 FOR MEDICARE OP): Performed by: STUDENT IN AN ORGANIZED HEALTH CARE EDUCATION/TRAINING PROGRAM

## 2024-08-05 PROCEDURE — 36415 COLL VENOUS BLD VENIPUNCTURE: CPT

## 2024-08-05 PROCEDURE — 85027 COMPLETE CBC AUTOMATED: CPT

## 2024-08-05 PROCEDURE — 2500000004 HC RX 250 GENERAL PHARMACY W/ HCPCS (ALT 636 FOR OP/ED): Performed by: STUDENT IN AN ORGANIZED HEALTH CARE EDUCATION/TRAINING PROGRAM

## 2024-08-05 PROCEDURE — 2500000005 HC RX 250 GENERAL PHARMACY W/O HCPCS: Performed by: STUDENT IN AN ORGANIZED HEALTH CARE EDUCATION/TRAINING PROGRAM

## 2024-08-05 RX ORDER — HYDROCORTISONE 25 MG/G
OINTMENT TOPICAL 2 TIMES DAILY
Status: DISCONTINUED | OUTPATIENT
Start: 2024-08-05 | End: 2024-08-07 | Stop reason: HOSPADM

## 2024-08-05 ASSESSMENT — PAIN SCALES - GENERAL
PAINLEVEL_OUTOF10: 7
PAINLEVEL_OUTOF10: 9
PAINLEVEL_OUTOF10: 7
PAINLEVEL_OUTOF10: 9
PAINLEVEL_OUTOF10: 10 - WORST POSSIBLE PAIN
PAINLEVEL_OUTOF10: 8
PAINLEVEL_OUTOF10: 7
PAINLEVEL_OUTOF10: 7
PAINLEVEL_OUTOF10: 9
PAINLEVEL_OUTOF10: 8
PAINLEVEL_OUTOF10: 9

## 2024-08-05 ASSESSMENT — PAIN DESCRIPTION - DESCRIPTORS
DESCRIPTORS: CRAMPING;DISCOMFORT;SORE

## 2024-08-05 ASSESSMENT — PAIN DESCRIPTION - LOCATION: LOCATION: OTHER (COMMENT)

## 2024-08-05 NOTE — LACTATION NOTE
Lactation Consultant Note  Lactation Consultation  Reason for Consult: Follow-up assessment  Consultant Name: Zenia Davenport RN, IBCLC    Maternal Information       Maternal Assessment  Breast Assessment: Large, Symmetrical, Soft, Compressible, Other (Comment) (very expressible)  Nipple Assessment: Intact, Erect, Large diameter, Other (Comment) (22 MM diameter)  Areola Assessment: Normal    Infant Assessment  Infant Behavior: Sleepy, Feeding cues observed, Readiness to feed, Suckles on and off, needs stimulation    Feeding Assessment  Nutrition Source: Breastmilk, Formula (per mother’s request)  Feeding Method: Nursing at the breast, Paced bottle  Feeding Position: Cradle, Baby led, Skin to skin, One side per feeding, Nipple to nose, Mother needs assistance with latch/positioning, Other (Comment) (minimal assistance with a slightly deeper latch)  Suck/Feeding: Sustained, Coordinated suck/swallow/breathe, Tactile stimulation needed, Audible swallowing with stimulaton  Latch Assessment: Minimal assistance is needed, Instructed on deep latch, Areolar attachment, Deep latch obtained, Optimal angle of mouth opening, Comfortable with no pain, Chin and lower lip contact breast first, Flanged lips    LATCH TOOL  Latch: Repeated attempts, hold nipple in mouth, stimulate to suck  Audible Swallowing: Spontaneous and intermittent (24 hours old)  Type of Nipple: Everted (After stimulation)  Comfort (Breast/Nipple): Soft/non-tender  Hold (Positioning): Minimal assist, teach one side, mother does other, staff holds  LATCH Score: 8    Breast Pump  Pump: Hospital grade electric pump, Double breast pumping, Massage  Frequency: Other (comment) (if mom chooses to supplement the baby - encouraged her to pump for 20 minutes on both breasts and then to feed the baby her own breast milk instead of formula)  Duration: Initiate phase  Breast Shield Size and Type: 24 mm    Other OB Lactation Tools       Patient Follow-up  Outpatient  "Lactation Follow-up: Recommended    Other OB Lactation Documentation  Maternal Risk Factors: Other (comment) (hx- depression)  Infant Risk Factors: Early term birth 37-39 weeks    Recommendations/Summary  Mom stated she was worried about the baby \"not getting enough last night\" and she gave some formula. She stated she doesn't think she has \"enough\" for the baby.   I reviewed cluster feeding and the reasoning behind it is to bring her milk in. If she supplements, that delay the milk from coming in and effects the supply. I reviewed the importance of 8-12 stimulations in a 24 hour period and if she is choosing to supplement, she needs to pump. I offered to set her up to pump and mom was receptive.     Oriented mom to pump set up- use- and cleaning of pump parts.     Reviewed the difference between latching and pumping, the benefit of skin to skin, the benefits of breast massage prior to pumping, expectations of volume with pumping, milk storage and cleaning of pump parts.     PI-123 and [facility] pump cleaning guide given.     While I was setting her up to pump, the baby started to show feeding cues and I offered to assist with latching and mom was receptive.   I showed mom how expressible she is and she was pleased to see this.   We were able to get the baby latched to the breast on the right side after a few attempts. (Baby wasn't keeping the tongue down and was pushing mom back out of his mouth). After a few attempts, the baby did obtain a deep latch and mom stated the latch as comfortable. Noted many swallows.     Reviewed feeding cues, breat feeding on demand, output on different days of life, average percentage of weight loss, milk production/ supply, and the other breastfeeding education topics.        Encouraged mom to breastfeed on demand with a goal of 8-12 feeds in a 24 hour period.   If baby is not showing feeding cues and it has been 3 hours since the last time the baby was fed or the last time the baby " attempted to feed, encouraged her to place baby in skin to skin.    If she chooses to supplement; encouraged her to pump for 20 minutes (initiation mode) on both breasts and to give the baby any pumped breast milk prior to any use of formula supplement.    If the baby latches and is content after the feeding; mom discouraged from pumping to avoid over production.     Questions answered.

## 2024-08-05 NOTE — PROGRESS NOTES
Postpartum Progress Note    Assessment/Plan   Bradford Clinton is a 28 y.o., , who delivered at 39w2d gestation    Now PPD#2 s/p , Low Transverse on 8/3/2024  - Continue routine postpartum care  - Pain only moderately controlled on po medications  - DVT risk score DVT Score: 8 , ppx with SCDs, ambulation, and lovenox  - Hgb:   Results from last 7 days   Lab Units 24  0513 24  2223   HEMOGLOBIN g/dL 8.4* 11.0*   - repeat CBC ordered for POD2 today      - RH: Rhogam not indicated   Type/Oxana  Results from last 7 days   Lab Units 24  2223   ABO GROUPING  O   RH TYPE  POS        Acute Blood Loss Anemia  - admission hgb 11.0 -> EBL 1530 mL -> POD#1 8.4  - asymptomatic  - PO Fe at DC     Hx Depression  - Depression per patient from before and during pregnancy  - No meds  - Concerned about adjustment to  care and reports feeling trauma from emergency c/s   - Recommending behavioral health consultation  - Recommending social work consultation to aid in identifying community resources for Michaelle and her family    Maternal Well-Being  - Vitals stable  - Using abdominal binder for support  - Still with moderate to severe abdominal pain  - All questions and concerns addressed     Feeding  - Breastfeeding/pumping encouraged  - Lactation consult prn    Contraception  - Nexplanon  - Education provided    Dispo  - Anticipate d/c on PPD #3 if meeting all postpartum milestones  - Follow-up in 1-2wks for incision check  - Follow-up in 4-6wks with primary OGYN    Yeni French MD     Principal Problem:    Encounter for induction of labor ([facility])    Hospital course: no complications    Subjective   Her pain is well controlled with current medications  She is passing flatus  She is ambulating well, but moderate discomfort with movement  She is tolerating a Adult diet Regular  She reports no breast or nursing problems    [place] reports that she is concerned about her ability to go home  and take care of herself, her , and the rest of her household. Reports that she is overwhelmed by her birth experience and stat c/s and needs time to process; doesn't feel like she's recovering well from the surgery.      Bradford Clinton is PPD#2 s/p  who is not feeling very well today overall.  No acute events overnight.  Voiding spontaneously, passing flatus.  Pain well controlled on PO meds.  Light lochia. Tolerating diet.      Objective   Allergies:   Ibuprofen, Iodine, Shellfish containing products, and Shellfish derived         Last Vitals:  Temp Pulse Resp BP MAP Pulse Ox   36.5 °C (97.7 °F) 74 18 125/75   98 %     Vitals Min/Max Last 24 Hours:  Temp  Min: 36.3 °C (97.3 °F)  Max: 37 °C (98.6 °F)  Pulse  Min: 74  Max: 90  Resp  Min: 18  Max: 20  BP  Min: 108/68  Max: 136/80    Intake/Output:   No intake or output data in the 24 hours ending 24 4316    Physical Exam:  General: examination reveals a well developed, well nourished, female, in no acute distress. She is alert and cooperative.  HEENT: external ears normal. Nose normal, no erythema or discharge.  Neck: supple, no significant adenopathy  Lungs: breathing even and unlabored, lungs clear  Cardiac: warm and well perfused, heart rate regular  Fundus: firm and below umbilicus, lochia light  Abdominal: soft, tender to palpation globally and more so in lower quadrants and incisionally; slightly distended, non tympanitic, dull to percussion  Extremities: no redness or tenderness in the calves or thighs, no edema.  Neurological: alert, oriented, normal speech, no focal findings or movement disorder noted.  Psychological: awake and alert; oriented to person, place, and time.  Skin: incision clean, dry, intact, well approximated, no redness, drainage, or warmth     Lab Data:  Lab Results   Component Value Date    WBC 8.5 2024    HGB 8.4 (L) 2024    HCT 25.3 (L) 2024     (L) 2024    ALT 11 2023    AST 10  07/07/2023     07/07/2023    K 4.1 07/07/2023     07/07/2023    CREATININE 0.53 07/07/2023    BUN 10 07/07/2023    CO2 24 07/07/2023    TSH 0.79 03/22/2024    HGBA1C 5.6 07/09/2024         I saw and evaluated the patient. I personally obtained the key and critical portions of the history and physical exam or was physically present for key and critical portions performed by the resident. I reviewed the resident documentation and discussed the patient with the resident. I agree with the resident medical decision making as documented in the note.    Ivonne Danielson, APRN-CNP

## 2024-08-06 PROBLEM — Z30.017 NEXPLANON INSERTION: Status: ACTIVE | Noted: 2024-08-06

## 2024-08-06 LAB
AMYLASE SERPL-CCNC: 68 U/L (ref 29–103)
ERYTHROCYTE [DISTWIDTH] IN BLOOD BY AUTOMATED COUNT: 14.2 % (ref 11.5–14.5)
HCT VFR BLD AUTO: 28.1 % (ref 36–46)
HGB BLD-MCNC: 9.3 G/DL (ref 12–16)
LIPASE SERPL-CCNC: 9 U/L (ref 9–82)
MCH RBC QN AUTO: 29.3 PG (ref 26–34)
MCHC RBC AUTO-ENTMCNC: 33.1 G/DL (ref 32–36)
MCV RBC AUTO: 89 FL (ref 80–100)
NRBC BLD-RTO: 0 /100 WBCS (ref 0–0)
PLATELET # BLD AUTO: 207 X10*3/UL (ref 150–450)
RBC # BLD AUTO: 3.17 X10*6/UL (ref 4–5.2)
WBC # BLD AUTO: 6.8 X10*3/UL (ref 4.4–11.3)

## 2024-08-06 PROCEDURE — 83690 ASSAY OF LIPASE: CPT

## 2024-08-06 PROCEDURE — 11981 INSERTION DRUG DLVR IMPLANT: CPT

## 2024-08-06 PROCEDURE — 2500000001 HC RX 250 WO HCPCS SELF ADMINISTERED DRUGS (ALT 637 FOR MEDICARE OP): Performed by: NURSE PRACTITIONER

## 2024-08-06 PROCEDURE — 0JHF3HZ INSERTION OF CONTRACEPTIVE DEVICE INTO LEFT UPPER ARM SUBCUTANEOUS TISSUE AND FASCIA, PERCUTANEOUS APPROACH: ICD-10-PCS

## 2024-08-06 PROCEDURE — 11981 INSERTION DRUG DLVR IMPLANT: CPT | Performed by: NURSE PRACTITIONER

## 2024-08-06 PROCEDURE — 36415 COLL VENOUS BLD VENIPUNCTURE: CPT

## 2024-08-06 PROCEDURE — 2500000001 HC RX 250 WO HCPCS SELF ADMINISTERED DRUGS (ALT 637 FOR MEDICARE OP)

## 2024-08-06 PROCEDURE — 1210000001 HC SEMI-PRIVATE ROOM DAILY

## 2024-08-06 PROCEDURE — 11981 INSERTION DRUG DLVR IMPLANT: CPT | Mod: GC

## 2024-08-06 PROCEDURE — 2500000005 HC RX 250 GENERAL PHARMACY W/O HCPCS: Performed by: STUDENT IN AN ORGANIZED HEALTH CARE EDUCATION/TRAINING PROGRAM

## 2024-08-06 PROCEDURE — 2500000004 HC RX 250 GENERAL PHARMACY W/ HCPCS (ALT 636 FOR OP/ED): Performed by: NURSE PRACTITIONER

## 2024-08-06 PROCEDURE — 2500000001 HC RX 250 WO HCPCS SELF ADMINISTERED DRUGS (ALT 637 FOR MEDICARE OP): Performed by: STUDENT IN AN ORGANIZED HEALTH CARE EDUCATION/TRAINING PROGRAM

## 2024-08-06 PROCEDURE — 85027 COMPLETE CBC AUTOMATED: CPT

## 2024-08-06 PROCEDURE — 80053 COMPREHEN METABOLIC PANEL: CPT | Performed by: STUDENT IN AN ORGANIZED HEALTH CARE EDUCATION/TRAINING PROGRAM

## 2024-08-06 PROCEDURE — 82150 ASSAY OF AMYLASE: CPT

## 2024-08-06 PROCEDURE — 2500000004 HC RX 250 GENERAL PHARMACY W/ HCPCS (ALT 636 FOR OP/ED): Performed by: STUDENT IN AN ORGANIZED HEALTH CARE EDUCATION/TRAINING PROGRAM

## 2024-08-06 RX ORDER — FERROUS SULFATE 325(65) MG
65 TABLET ORAL
Status: DISCONTINUED | OUTPATIENT
Start: 2024-08-06 | End: 2024-08-07 | Stop reason: HOSPADM

## 2024-08-06 RX ORDER — LIDOCAINE HYDROCHLORIDE 10 MG/ML
INJECTION INFILTRATION; PERINEURAL
Status: DISPENSED
Start: 2024-08-06 | End: 2024-08-07

## 2024-08-06 ASSESSMENT — PAIN SCALES - GENERAL
PAINLEVEL_OUTOF10: 6
PAINLEVEL_OUTOF10: 9
PAINLEVEL_OUTOF10: 6
PAINLEVEL_OUTOF10: 8
PAINLEVEL_OUTOF10: 8

## 2024-08-06 NOTE — LACTATION NOTE
"Lactation Consultant Note  Lactation Consultation  Reason for Consult: Follow-up assessment  Consultant Name: YESSI Whitlock RN IBCLC    Maternal Information  Has mother  before?: Yes  How long did the mother previously breastfeed?: 5 other children between 7 weeks - 2 years  Previous Maternal Breastfeeding Challenges: Low milk supply, Difficult latch  Infant to breast within first 2 hours of birth?: Yes  Exclusive Pump and Bottle Feed: No    Maternal Assessment  Breast Assessment: Medium, Symmetrical, Soft, Compressible  Nipple Assessment: Intact, Erect, Other (Comment) (very expressible)  [place] Assessment: Normal    Infant Assessment  Infant Behavior: Light sleep    Feeding Assessment  Nutrition Source: Breastmilk, Formula (per mother’s request)  Feeding Method: Nursing at the breast, Feeding expressed breastmilk, Paced bottle    LATCH TOOL       Breast Pump  Pump: Hospital grade electric pump, Double breast pumping  Frequency: Less than 3 times per day  Duration: 15-20 minutes per session  Breast Shield Size and Type: 24 mm  Volume of Milk Production: 24    Other OB Lactation Tools       Patient Follow-up  Inpatient Lactation Follow-up Needed : No    Other OB Lactation Documentation  Maternal Risk Factors: Extreme tiredness, fatigue or stress, Previous low supply,  delivery  Infant Risk Factors: Early term birth 37-39 weeks, Prelacteal feeds    Recommendations/Summary    The mother states that she feels breastfeeding is going \"great\". She did experience some post-surgical pain during the night and sent her infant to the nursery. She was able to pump and collect between 20-24 ml colostrum which she fed to her infant. The mother denies any difficulty with latching or pain/discomfort while breastfeeding. Although, she only fed her 2-year-old for 7 weeks, she feels that this infant feeds more like other other children who were  for 2 years. We discussed outpatient lactation services, " including [facility], and other outpatient lactation services. She is offered assistance with lactation as needed.
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
## 2024-08-06 NOTE — PROCEDURES
Nexplanon Insertion  Written consent obtained.  Discussed risks and benefits of insertion:  bleeding, infection, injury to surrounding structures.  We reviewed the possibility of unscheduled bleeding vs amenorrhea, or symptoms ranging between.  All questions/concerns addressed.    Marked area for insertion approximately 8-10 cm from medial epicondyle and 3-5 cm inferior to biceps groove of left upper extremity.    Area cleaned with alcohol swab.    3 mL of 1% lidocaine injected along planned insertion site.   Skin then cleansed with alcohol (iodine allergy).    Confirmed implant within wide bore needle of inserted, injected subdermally to hub of needle, implant deployed via purple slider, and  removed.    Implant palpated.  Insertion side covered with an adhesive bandage.  A pressure dressing was applied.      Patient tolerated the procedure well.  Nexplanon cards completed. One to patient, one to patient chart for documentation.    Procedure performed with Meera French MD, MPH  Obstetrics & Gynecology, PGY-1
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
## 2024-08-07 ENCOUNTER — PHARMACY VISIT (OUTPATIENT)
Dept: PHARMACY | Facility: CLINIC | Age: 29
End: 2024-08-07
Payer: MEDICAID

## 2024-08-07 VITALS
DIASTOLIC BLOOD PRESSURE: 90 MMHG | HEART RATE: 81 BPM | RESPIRATION RATE: 16 BRPM | WEIGHT: 224.87 LBS | TEMPERATURE: 97.5 F | OXYGEN SATURATION: 98 % | SYSTOLIC BLOOD PRESSURE: 141 MMHG | HEIGHT: 64 IN | BODY MASS INDEX: 38.39 KG/M2

## 2024-08-07 PROBLEM — R03.0 ELEVATED BLOOD PRESSURE READING WITHOUT DIAGNOSIS OF HYPERTENSION: Status: ACTIVE | Noted: 2024-08-07

## 2024-08-07 PROBLEM — D62 ACUTE BLOOD LOSS ANEMIA: Status: ACTIVE | Noted: 2024-08-07

## 2024-08-07 LAB
ALBUMIN SERPL BCP-MCNC: 3.3 G/DL (ref 3.4–5)
ALP SERPL-CCNC: 121 U/L (ref 33–110)
ALT SERPL W P-5'-P-CCNC: 16 U/L (ref 7–45)
ANION GAP SERPL CALC-SCNC: 16 MMOL/L (ref 10–20)
AST SERPL W P-5'-P-CCNC: 15 U/L (ref 9–39)
BILIRUB SERPL-MCNC: 0.3 MG/DL (ref 0–1.2)
BUN SERPL-MCNC: 6 MG/DL (ref 6–23)
CALCIUM SERPL-MCNC: 8.7 MG/DL (ref 8.6–10.6)
CHLORIDE SERPL-SCNC: 104 MMOL/L (ref 98–107)
CO2 SERPL-SCNC: 25 MMOL/L (ref 21–32)
CREAT SERPL-MCNC: 0.57 MG/DL (ref 0.5–1.05)
EGFRCR SERPLBLD CKD-EPI 2021: >90 ML/MIN/1.73M*2
GLUCOSE SERPL-MCNC: 88 MG/DL (ref 74–99)
LABORATORY COMMENT REPORT: NORMAL
PATH REPORT.FINAL DX SPEC: NORMAL
PATH REPORT.GROSS SPEC: NORMAL
PATH REPORT.RELEVANT HX SPEC: NORMAL
PATH REPORT.TOTAL CANCER: NORMAL
POTASSIUM SERPL-SCNC: 3.6 MMOL/L (ref 3.5–5.3)
PROT SERPL-MCNC: 6.7 G/DL (ref 6.4–8.2)
SODIUM SERPL-SCNC: 141 MMOL/L (ref 136–145)

## 2024-08-07 PROCEDURE — RXMED WILLOW AMBULATORY MEDICATION CHARGE

## 2024-08-07 PROCEDURE — 2500000001 HC RX 250 WO HCPCS SELF ADMINISTERED DRUGS (ALT 637 FOR MEDICARE OP): Performed by: NURSE PRACTITIONER

## 2024-08-07 PROCEDURE — 99239 HOSP IP/OBS DSCHRG MGMT >30: CPT | Performed by: STUDENT IN AN ORGANIZED HEALTH CARE EDUCATION/TRAINING PROGRAM

## 2024-08-07 PROCEDURE — 2500000004 HC RX 250 GENERAL PHARMACY W/ HCPCS (ALT 636 FOR OP/ED): Performed by: STUDENT IN AN ORGANIZED HEALTH CARE EDUCATION/TRAINING PROGRAM

## 2024-08-07 PROCEDURE — 2500000001 HC RX 250 WO HCPCS SELF ADMINISTERED DRUGS (ALT 637 FOR MEDICARE OP): Performed by: STUDENT IN AN ORGANIZED HEALTH CARE EDUCATION/TRAINING PROGRAM

## 2024-08-07 RX ORDER — ACETAMINOPHEN 325 MG/1
975 TABLET ORAL EVERY 6 HOURS
Qty: 180 TABLET | Refills: 0 | Status: SHIPPED | OUTPATIENT
Start: 2024-08-07

## 2024-08-07 RX ORDER — FERROUS SULFATE 325(65) MG
65 TABLET ORAL
Qty: 30 TABLET | Refills: 1 | Status: SHIPPED | OUTPATIENT
Start: 2024-08-07 | End: 2024-10-06

## 2024-08-07 RX ORDER — SIMETHICONE 80 MG
80 TABLET,CHEWABLE ORAL 4 TIMES DAILY PRN
Qty: 30 TABLET | Refills: 0 | Status: SHIPPED | OUTPATIENT
Start: 2024-08-07

## 2024-08-07 RX ORDER — NALOXONE HYDROCHLORIDE 4 MG/.1ML
4 SPRAY NASAL AS NEEDED
Qty: 2 EACH | Refills: 0 | Status: SHIPPED | OUTPATIENT
Start: 2024-08-07

## 2024-08-07 RX ORDER — POLYETHYLENE GLYCOL 3350 17 G/17G
17 POWDER, FOR SOLUTION ORAL 2 TIMES DAILY PRN
Qty: 14 EACH | Refills: 0 | Status: SHIPPED | OUTPATIENT
Start: 2024-08-07

## 2024-08-07 RX ORDER — OXYCODONE HYDROCHLORIDE 5 MG/1
5 TABLET ORAL EVERY 6 HOURS PRN
Qty: 20 TABLET | Refills: 0 | Status: SHIPPED | OUTPATIENT
Start: 2024-08-07 | End: 2024-08-12

## 2024-08-07 ASSESSMENT — PAIN SCALES - GENERAL
PAINLEVEL_OUTOF10: 6
PAINLEVEL_OUTOF10: 8
PAINLEVEL_OUTOF10: 7
PAINLEVEL_OUTOF10: 8

## 2024-08-07 NOTE — DISCHARGE SUMMARY
Postpartum Discharge Summary    Admission Date: 2024  Discharge Date: 24     Discharge Diagnosis  Problem List Items Addressed This Visit       Ultrasound for  screening for fetal growth restriction ([facility])    Overview     FGR last growth US  EFW 2313g (9%), AC 17%, normal dopplers  Weekly BPP with dopplers  Repeat growth US on  to determine delivery timing (38 vs 39 weeks)         Relevant Orders    Labor Induction (Completed)     delivery delivered ([facility]) - Primary    Relevant Medications    acetaminophen (Tylenol) 325 mg tablet    oxyCODONE (Roxicodone) 5 mg immediate release tablet    polyethylene glycol (Glycolax, Miralax) 17 gram packet    simethicone (Mylicon) 80 mg chewable tablet    naloxone (Narcan) 4 mg/0.1 mL nasal spray    Other Relevant Orders    Referral to Ob-Gyn Behavioral Health    Acute blood loss anemia    Relevant Medications    ferrous sulfate, 325 mg ferrous sulfate, tablet        Bradford Clinton is a 28 y.o. female now  and postpartum day 4      Pregnancy notable for:  - FGR, last growth EFW 9%, Dopplers today wnl, recommend 39.0 delivery per MFM  - h/o shoulder dystocia in P3 relieved with [facility] Hospital Course  Admitted for IOL in s/o FGR -> PLTCS for NRFHT  Delivery Date: 8/3/2024 10:36 PM  Delivery type: , Low Transverse   GA at delivery: 39w2d  Outcome: Living  Anesthesia during delivery: Epidural;Combined spinal-epidural  Intrapartum complications: Fetal Intolerance  Feeding method: Breastfeeding Status: Yes     Delivery complications: none  Contraception at discharge: Nexplanon    Complications Requiring Follow-Up  Elevated BP, unclear diagnosis of HDP. Pt had a mild range BP x 1 on day of discharge. She believes she had another elevated BP during pregnancy visit at [facility] but she is unsure of her GA at that time and I am unable to view records. Denies SPEC symptoms. Will send home with BP cuff and have pt follow-up with  primary OB at [facility] within 1 week.     Pertinent Subjective and Physical Exam At Time of Discharge  Patient seen at bedside - doing well and no acute events overnight. Pain well-controlled on current regimen - requiring oxycodone for pain control (PDMP OARRS reviewed), lochia light, voiding spontaneously, passing flatus, ambulating independently, and tolerating PO.     She is struggling with post-op pain. Reports some improvement with medication and is able to ambulate more today. Has not yet had a BM and likely constipation is contributing to pain. Will give Miralax and Simethicone. Labs from yesterday all WNL - suspect pain is d/t post-op state.     General: well appearing, well-nourished, postpartum  Obstetric: abdomen soft/non-tender, fundus firm below umbilicus, lochia light, Pfannenstiel incision c/d/i  Skin: No rashes/lesions/erythema  Neuro: A/Ox3, conversational  GI: +BS, +flatus  Respiratory: Even and unlabored on RA, LSCTA BL  Cardiovascular: RRR, normal S1, S2  Extremities: No edema, discoloration, or pain in BLE, equal and palpable DP and PT pulses    Psych: appropriate mood and affect     Discharge Meds     Your medication list        START taking these medications        Instructions Last Dose Given Next Dose Due   ferrous sulfate (325 mg ferrous sulfate) tablet      Take 1 tablet by mouth once daily with breakfast.       naloxone 4 mg/0.1 mL nasal spray  Commonly known as: Narcan      Administer 1 spray (4 mg) into affected nostril(s) if needed for opioid reversal or respiratory depression. May repeat every 2-3 minutes if needed, alternating nostrils, until medical assistance becomes available.       oxyCODONE 5 mg immediate release tablet  Commonly known as: Roxicodone      Take 1 tablet (5 mg) by mouth every 6 hours if needed (Pain score 6-8) for up to 5 days.       polyethylene glycol 17 gram packet  Commonly known as: Glycolax, Miralax      Take 17 g (mix 1 packet in 8 oz of liquid) and drink by  mouth 2 times a day as needed (first line).       simethicone 80 mg chewable tablet  Commonly known as: Mylicon      Chew 1 tablet (80 mg) 4 times a day as needed for flatulence.              CHANGE how you take these medications        Instructions Last Dose Given Next Dose Due   acetaminophen 325 mg tablet  Commonly known as: Tylenol  What changed:   when to take this  reasons to take this      Take 3 tablets (975 mg) by mouth every 6 hours.              CONTINUE taking these medications        Instructions Last Dose Given Next Dose Due   cetirizine 10 mg tablet  Commonly known as: ZyrTEC      Take 1 tablet (10 mg) by mouth once daily.       loratadine 10 mg tablet  Commonly known as: Claritin           magnesium oxide 400 mg (241.3 mg magnesium) tablet  Commonly known as: Mag-Ox      Take 1 tablet (400 mg) by mouth once daily at bedtime.       triamcinolone 55 mcg nasal inhaler  Commonly known as: Nasacort      Administer 2 sprays into each nostril once daily.              STOP taking these medications      aspirin 81 mg EC tablet                  Where to Get Your Medications        These medications were sent to [facility]  [address] [place] [address]      Hours: 8:30 AM to 5 PM Mon-Fri Phone: [phone]   acetaminophen 325 mg tablet  ferrous sulfate (325 mg ferrous sulfate) tablet  naloxone 4 mg/0.1 mL nasal spray  oxyCODONE 5 mg immediate release tablet  polyethylene glycol 17 gram packet  simethicone 80 mg chewable tablet          Test Results Pending At Discharge  Pending Labs       Order Current Status    Surgical Pathology Exam - [facility] In process            Outpatient Follow-Up  No future appointments.  Patient instructed to call to schedule  2-5 days for BP check and incision check and 4-6 weeks for routine postpartum visit  with [facility].    I spent 40 minutes in the professional and overall care of this patient.      Aubrie Lo PA-C  08/07/24 9:49 AM  [place]

## 2024-08-07 NOTE — NURSING NOTE
Patient meets criteria for home monitoring of blood pressure post discharge.  Met with patient to assess for availability of home BP monitor.  Patient does not have access to BP monitor at home.  Pt agreed to order home BP monitor from [employer]. BP monitor delivered to room.  Patient educated on how to use BP monitor, recording BP’s on home monitoring log and s/sx to report to her provider.  Pt verbalized understanding the above information.

## 2024-08-07 NOTE — CARE PLAN
Pt stable for dicharge, pain controlled with oxy/tyl, VSS, home with BP cuff and instructions on when to call. Pt meeting all postpartum milestones, breastfeeding her . All discharge instructions completed and questions answered.

## 2025-01-21 ENCOUNTER — APPOINTMENT (OUTPATIENT)
Dept: RADIOLOGY | Facility: HOSPITAL | Age: 30
End: 2025-01-21
Payer: COMMERCIAL

## 2025-01-21 ENCOUNTER — HOSPITAL ENCOUNTER (EMERGENCY)
Facility: HOSPITAL | Age: 30
Discharge: HOME | End: 2025-01-21
Attending: EMERGENCY MEDICINE
Payer: COMMERCIAL

## 2025-01-21 VITALS
DIASTOLIC BLOOD PRESSURE: 80 MMHG | WEIGHT: 224 LBS | HEART RATE: 95 BPM | SYSTOLIC BLOOD PRESSURE: 135 MMHG | OXYGEN SATURATION: 94 % | HEIGHT: 64 IN | RESPIRATION RATE: 16 BRPM | TEMPERATURE: 97.2 F | BODY MASS INDEX: 38.24 KG/M2
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
## 2025-01-21 DIAGNOSIS — K08.89 PAIN, DENTAL: ICD-10-CM

## 2025-01-21 DIAGNOSIS — M79.642 LEFT HAND PAIN: ICD-10-CM

## 2025-01-21 PROCEDURE — 73110 X-RAY EXAM OF WRIST: CPT | Mod: LEFT SIDE | Performed by: RADIOLOGY

## 2025-01-21 PROCEDURE — 70450 CT HEAD/BRAIN W/O DYE: CPT | Performed by: RADIOLOGY

## 2025-01-21 PROCEDURE — 76377 3D RENDER W/INTRP POSTPROCES: CPT

## 2025-01-21 PROCEDURE — 73130 X-RAY EXAM OF HAND: CPT | Mod: LT

## 2025-01-21 PROCEDURE — 70486 CT MAXILLOFACIAL W/O DYE: CPT

## 2025-01-21 PROCEDURE — 76377 3D RENDER W/INTRP POSTPROCES: CPT | Performed by: RADIOLOGY

## 2025-01-21 PROCEDURE — 70486 CT MAXILLOFACIAL W/O DYE: CPT | Performed by: RADIOLOGY

## 2025-01-21 PROCEDURE — 73090 X-RAY EXAM OF FOREARM: CPT | Mod: LEFT SIDE | Performed by: RADIOLOGY

## 2025-01-21 PROCEDURE — 73110 X-RAY EXAM OF WRIST: CPT | Mod: LT

## 2025-01-21 PROCEDURE — 2500000005 HC RX 250 GENERAL PHARMACY W/O HCPCS: Mod: SE

## 2025-01-21 PROCEDURE — 2500000001 HC RX 250 WO HCPCS SELF ADMINISTERED DRUGS (ALT 637 FOR MEDICARE OP): Mod: SE

## 2025-01-21 PROCEDURE — 70450 CT HEAD/BRAIN W/O DYE: CPT

## 2025-01-21 PROCEDURE — 73090 X-RAY EXAM OF FOREARM: CPT | Mod: LT

## 2025-01-21 PROCEDURE — 96372 THER/PROPH/DIAG INJ SC/IM: CPT

## 2025-01-21 PROCEDURE — 99284 EMERGENCY DEPT VISIT MOD MDM: CPT | Mod: 25 | Performed by: EMERGENCY MEDICINE

## 2025-01-21 PROCEDURE — 73130 X-RAY EXAM OF HAND: CPT | Mod: LEFT SIDE | Performed by: RADIOLOGY

## 2025-01-21 PROCEDURE — 2500000004 HC RX 250 GENERAL PHARMACY W/ HCPCS (ALT 636 FOR OP/ED): Mod: SE

## 2025-01-21 RX ORDER — METOCLOPRAMIDE 10 MG/1
10 TABLET ORAL ONCE
Status: COMPLETED | OUTPATIENT
Start: 2025-01-21 | End: 2025-01-21

## 2025-01-21 RX ORDER — OXYCODONE HYDROCHLORIDE 5 MG/1
5 TABLET ORAL EVERY 6 HOURS PRN
Qty: 15 TABLET | Refills: 0 | Status: SHIPPED | OUTPATIENT
Start: 2025-01-21 | End: 2025-01-28

## 2025-01-21 RX ORDER — METOCLOPRAMIDE HYDROCHLORIDE 5 MG/ML
10 INJECTION INTRAMUSCULAR; INTRAVENOUS ONCE
Status: DISCONTINUED | OUTPATIENT
Start: 2025-01-21 | End: 2025-01-21

## 2025-01-21 RX ORDER — IBUPROFEN 600 MG/1
600 TABLET ORAL EVERY 6 HOURS PRN
Qty: 20 TABLET | Refills: 0 | Status: SHIPPED | OUTPATIENT
Start: 2025-01-21

## 2025-01-21 RX ORDER — KETOROLAC TROMETHAMINE 15 MG/ML
15 INJECTION, SOLUTION INTRAMUSCULAR; INTRAVENOUS ONCE
Status: DISCONTINUED | OUTPATIENT
Start: 2025-01-21 | End: 2025-01-21

## 2025-01-21 RX ORDER — ACETAMINOPHEN 500 MG
1000 TABLET ORAL EVERY 6 HOURS PRN
Qty: 30 TABLET | Refills: 0 | Status: SHIPPED | OUTPATIENT
Start: 2025-01-21 | End: 2025-01-31

## 2025-01-21 RX ORDER — OXYCODONE HYDROCHLORIDE 5 MG/1
5 TABLET ORAL EVERY 6 HOURS PRN
Qty: 15 TABLET | Refills: 0 | Status: SHIPPED | OUTPATIENT
Start: 2025-01-21 | End: 2025-01-21

## 2025-01-21 RX ORDER — ACETAMINOPHEN 325 MG/1
975 TABLET ORAL ONCE
Status: COMPLETED | OUTPATIENT
Start: 2025-01-21 | End: 2025-01-21

## 2025-01-21 RX ORDER — KETOROLAC TROMETHAMINE 15 MG/ML
15 INJECTION, SOLUTION INTRAMUSCULAR; INTRAVENOUS ONCE
Status: COMPLETED | OUTPATIENT
Start: 2025-01-21 | End: 2025-01-21

## 2025-01-21 RX ORDER — LIDOCAINE HYDROCHLORIDE 20 MG/ML
1.25 SOLUTION OROPHARYNGEAL ONCE
Status: COMPLETED | OUTPATIENT
Start: 2025-01-21 | End: 2025-01-21

## 2025-01-21 RX ORDER — LIDOCAINE HYDROCHLORIDE 20 MG/ML
1.25 SOLUTION OROPHARYNGEAL AS NEEDED
Qty: 100 ML | Refills: 0 | Status: SHIPPED | OUTPATIENT
Start: 2025-01-21 | End: 2025-01-21

## 2025-01-21 RX ORDER — LIDOCAINE HYDROCHLORIDE 20 MG/ML
1.25 SOLUTION OROPHARYNGEAL AS NEEDED
Qty: 100 ML | Refills: 0 | Status: SHIPPED | OUTPATIENT
Start: 2025-01-21 | End: 2025-01-22

## 2025-01-21 RX ORDER — ACETAMINOPHEN 500 MG
1000 TABLET ORAL EVERY 6 HOURS PRN
Qty: 30 TABLET | Refills: 0 | Status: SHIPPED | OUTPATIENT
Start: 2025-01-21 | End: 2025-01-21

## 2025-01-21 RX ADMIN — ACETAMINOPHEN 975 MG: 325 TABLET ORAL at 02:17

## 2025-01-21 RX ADMIN — KETOROLAC TROMETHAMINE 15 MG: 15 INJECTION, SOLUTION INTRAMUSCULAR; INTRAVENOUS at 02:17

## 2025-01-21 RX ADMIN — METOCLOPRAMIDE 10 MG: 10 TABLET ORAL at 02:17

## 2025-01-21 RX ADMIN — LIDOCAINE HYDROCHLORIDE 1.25 ML: 20 SOLUTION ORAL at 02:17

## 2025-01-21 ASSESSMENT — LIFESTYLE VARIABLES
TOTAL SCORE: 0
EVER HAD A DRINK FIRST THING IN THE MORNING TO STEADY YOUR NERVES TO GET RID OF A HANGOVER: NO
EVER FELT BAD OR GUILTY ABOUT YOUR DRINKING: NO
HAVE YOU EVER FELT YOU SHOULD CUT DOWN ON YOUR DRINKING: NO
HAVE PEOPLE ANNOYED YOU BY CRITICIZING YOUR DRINKING: NO

## 2025-01-21 ASSESSMENT — PAIN SCALES - GENERAL: PAINLEVEL_OUTOF10: 0 - NO PAIN

## 2025-01-21 ASSESSMENT — COLUMBIA-SUICIDE SEVERITY RATING SCALE - C-SSRS
1. IN THE PAST MONTH, HAVE YOU WISHED YOU WERE DEAD OR WISHED YOU COULD GO TO SLEEP AND NOT WAKE UP?: NO
6. HAVE YOU EVER DONE ANYTHING, STARTED TO DO ANYTHING, OR PREPARED TO DO ANYTHING TO END YOUR LIFE?: NO
2. HAVE YOU ACTUALLY HAD ANY THOUGHTS OF KILLING YOURSELF?: NO

## 2025-01-21 ASSESSMENT — PAIN - FUNCTIONAL ASSESSMENT: PAIN_FUNCTIONAL_ASSESSMENT: 0-10

## 2025-01-21 NOTE — DISCHARGE INSTRUCTIONS
Please return to the emergency department, should you have any worsening symptoms, are unable to get an appointment with your primary care physician and/or specialty provider within the discussed time frame, or have any further concerns.     Please follow up with: Primary care physician in the next week.    Please follow-up with orthopedic surgery in 2 weeks.  Please call [phone] to schedule an appointment    You may take ibuprofen or tylenol for pain. You may alternate ibuprofen and tylenol every 6 hours for better pain control.    Do not exceed 2400mg of ibuprofen or 4000mg of tylenol in a 24 hour period.

## 2025-01-21 NOTE — Clinical Note
Bradford Clinton was seen and treated in our emergency department on 1/21/2025.  She may return to work on 01/28/2025.       If you have any questions or concerns, please don't hesitate to call.      Lamont Armas, DO

## 2025-01-21 NOTE — ED TRIAGE NOTES
Pt was going up the stairs and fell hitting her head and breaking her glasses , pt has small contusion left temple region -LOC, -AC

## 2025-01-21 NOTE — ED PROVIDER NOTES
History of Present Illness     History provided by: Patient  Limitations to History: None  External Records Reviewed with Brief Summary:  Recent discharge summary from 2024 per patient presented for  delivery and Nexplanon insertion.  Discharged in stable condition.    HPI:  Bradford Clinton is a 29 y.o. female with prior history of lumbar surgery presenting to the emergency department after an accidental fall while walking up her stairs.  Patient reports that she fell and hit the left side of her head and broke her glasses.  She denies any loss of consciousness but says that everything happened so fast that she does not really know what happened.  She declines any anticoagulation use.  She only notes pain in the left upper extremity and left side of her face.  No change in her vision or any other areas of pain. She denies any dizziness, neck pain, back pain, chest pain, shortness of breath, nausea, vomiting, abdominal pain, diarrhea, urinary symptoms, numbness, tingling, fevers, or chills.   Physical Exam   Triage vitals:  T 36.2 °C (97.2 °F)  HR 95  /80  RR 16  O2 94 % None (Room air)    GEN:  A&Ox3, no acute distress, Conversational and appropriate.    HEENT: Left temporal area with hematoma and bruising to the lateral face and upper left eye ridge. Conjunctiva pink with no redness or exudates. Hearing grossly intact. Moist mucous membranes.  PERRLA, EOMI.  Neck: supple, full ROM intact, no midline tenderness, no step-offs or deformities.   CARDIO: Normal rate and regular rhythm. Normal S1, S2  without murmurs, rubs, or gallops.   PULM: Clear to auscultation bilaterally. No rales, rhonchi, or wheezes. No accessory muscle use or stridor.  GI: Soft, non-tender, non-distended. No rebound tenderness or guarding.   SKIN: Warm and dry. No rashes, lesions, petechiae, or purpura.  MSK: No tenderness in the bilateral shoulders, hips, or lower extremities.  Left upper extremity tenderness within the  distal left forearm and left hand with positive snuffbox tenderness and small area of swelling over the distal forearm.  Prior surgical scar which is well-healed.  2+ pulse in the bilateral upper extremities.  Normal sensation within the left upper extremity.  No pain in the right upper extremity.  No obvious bony deformities noted. Compartments are soft.   NEURO: No focal findings identified. Cranial nerves 2-12 intact. No confusion or gross mental status changes. Strength 5/5 in upper and lower extremities bilaterally. Sensation intact to light touch throughout.   Back: no midline tenderness, no step-offs or deformities.   PSYCH: Appropriate mood and behavior, converses and responds appropriately during exam.    Medical Decision Making & ED Course   Medical Decision Makin y.o. female with prior history of lumbar surgery presenting to the emergency department after an accidental fall while walking up her stairs. In the Emergency Department, hospital records were reviewed.  The patient is afebrile with stable vital signs. The patient is in no respiratory distress, satting well on room air.  Patient is neurologically and neurovascularly intact. Patient was given Tylenol, Reglan, and Toradol with improvement of her headache and pain.  CT head did not show any acute findings. CT maxillofacial was negative for any acute fractures.  Patient also had x-rays of the left forearm, wrist, and hand given her tenderness to palpation overlying the distal wrist with concern for possible scaphoid fracture.  X-rays did not show any acute fractures, however given that she still had snuffbox tenderness, will plan for thumb spica splint placement and orthopedic follow-up.  On reevaluation, patient noted improvement of her symptoms. She was informed of the results. She remained stable. Thumb spica splint was placed to her left upper extremity. Patient remained neurovascularly intact afterwards. I discussed additional discharge  instructions including pain control with Tylenol, ibuprofen (which patient reports that she has taken before at home with no issues), and a short course of oxycodone to help with her symptoms.  She was comfortable with this plan, advised to follow-up with orthopedic surgery in 2 weeks.  The patient was instructed of supportive measures and to also follow-up with a primary care physician. Return precautions were provided, for which the patient expressed understanding. The patient was discharged home in stable condition. They should feel free to return to the Emergency Department at any time should their condition worsen or should they have any questions or concerns.       ----      Differential diagnoses considered include but are not limited to: Scaphoid fracture, orbital ridge fracture, intracranial pathology such as hemorrhage, fracture, contusion, musculoskeletal pain/strain/sprain    Social Determinants of Health which Significantly Impact Care: None identified     EKG Independent Interpretation: Please see ED course for interpretation of EKG    Independent Result Review and Interpretation: Please see ED course and MDM for interpretation of results and interpretation    Chronic conditions affecting the patient's care:Please see ED course and MDM for interpretation of chronic conditions    The patient was discussed with the following consultants/services: None    Care Considerations: As documented in ED course and MDM.    ED Course:  ED Course as of 01/22/25 2229   Tue Jan 21, 2025   0219 CT head wo IV contrast  No acute intracranial abnormalities. [AD]   0219 CT maxillofacial bones wo IV contrast  Left periorbital hematoma. [AD]   0220 XR hand left 3+ views  Unremarkable for any acute fracture [AD]      ED Course User Index  [AD] Lamont Armas, DO         Diagnoses as of 01/22/25 2229   Fall, initial encounter   Left hand pain   Pain, dental       Disposition   As a result of the work-up, the patient was  discharged home.  she was informed of her diagnosis and instructed to come back with any concerns or worsening of condition.  she and was agreeable to the plan as discussed above.  she was given the opportunity to ask questions.  All of the patient's questions were answered.    Procedures   Procedures    Patient seen and discussed with ED attending physician    Lamont Chan DO  Emergency Medicine     Lamont Chan DO  Resident  01/23/25 [id number]       Kaylee Rosales MD  01/23/25 [id number]

## 2025-01-28 ENCOUNTER — DOCUMENTATION (OUTPATIENT)
Dept: OBSTETRICS AND GYNECOLOGY | Facility: CLINIC | Age: 30
End: 2025-01-28
Payer: COMMERCIAL

## 2025-01-28 NOTE — PROGRESS NOTES
Community Resource Name:   Phone Number:   Staff Member:      Discussed the following topics on behalf of the patient:  []  Behavioral Health Assistance     []  Case Management  []   Assistance  []  Digital Equity Assistance  []  Dental Health Assistance  []  Education Assistance  []  Employment Assistance  []  Financial Strain Relief Assistance  []  Food Insecurity Assistance  []  Healthcare Coverage Assistance  []  Housing Stability Assistance  []  IP Violence Relief Assistance  []  Legal Assistance  []  Physical Activity Assistance  []  Social Connection Assistance  []  Stress Relief Assistance   []  Substance Abuse Assistance  []  Transportation Assistance  []  Utility Assistance  [x]  Other: [insert comment here]    Next Steps:   The patient was enrolled in the [facility] program.      Renee Pavon MA

## 2025-04-28 ENCOUNTER — HOSPITAL ENCOUNTER (EMERGENCY)
Facility: HOSPITAL | Age: 30
Discharge: HOME | End: 2025-04-28
Payer: COMMERCIAL

## 2025-04-28 VITALS
SYSTOLIC BLOOD PRESSURE: 114 MMHG | TEMPERATURE: 97 F | HEIGHT: 64 IN | BODY MASS INDEX: 40.97 KG/M2 | RESPIRATION RATE: 18 BRPM | DIASTOLIC BLOOD PRESSURE: 76 MMHG | OXYGEN SATURATION: 99 % | WEIGHT: 240 LBS | HEART RATE: 101 BPM

## 2025-04-28 DIAGNOSIS — R09.A2 FOREIGN BODY SENSATION IN THROAT: Primary | ICD-10-CM

## 2025-04-28 PROCEDURE — 99282 EMERGENCY DEPT VISIT SF MDM: CPT

## 2025-04-28 PROCEDURE — 99284 EMERGENCY DEPT VISIT MOD MDM: CPT | Performed by: NURSE PRACTITIONER

## 2025-04-28 PROCEDURE — 2500000005 HC RX 250 GENERAL PHARMACY W/O HCPCS: Mod: SE | Performed by: NURSE PRACTITIONER

## 2025-04-28 RX ORDER — LIDOCAINE HYDROCHLORIDE 20 MG/ML
5 SOLUTION OROPHARYNGEAL ONCE
Status: COMPLETED | OUTPATIENT
Start: 2025-04-28 | End: 2025-04-28

## 2025-04-28 RX ADMIN — LIDOCAINE HYDROCHLORIDE 5 ML: 20 SOLUTION ORAL at 22:12

## 2025-04-28 ASSESSMENT — PAIN DESCRIPTION - FREQUENCY: FREQUENCY: CONSTANT/CONTINUOUS

## 2025-04-28 ASSESSMENT — PAIN SCALES - GENERAL: PAINLEVEL_OUTOF10: 6

## 2025-04-28 ASSESSMENT — LIFESTYLE VARIABLES
EVER HAD A DRINK FIRST THING IN THE MORNING TO STEADY YOUR NERVES TO GET RID OF A HANGOVER: NO
EVER FELT BAD OR GUILTY ABOUT YOUR DRINKING: NO
HAVE YOU EVER FELT YOU SHOULD CUT DOWN ON YOUR DRINKING: NO
HAVE PEOPLE ANNOYED YOU BY CRITICIZING YOUR DRINKING: NO
TOTAL SCORE: 0

## 2025-04-28 ASSESSMENT — PAIN - FUNCTIONAL ASSESSMENT: PAIN_FUNCTIONAL_ASSESSMENT: 0-10

## 2025-04-28 ASSESSMENT — PAIN DESCRIPTION - PAIN TYPE: TYPE: ACUTE PAIN

## 2025-04-28 ASSESSMENT — PAIN DESCRIPTION - LOCATION: LOCATION: THROAT

## 2025-04-28 ASSESSMENT — PAIN DESCRIPTION - DESCRIPTORS: DESCRIPTORS: ACHING

## 2025-04-28 NOTE — Clinical Note
Bradford Clinton was seen and treated in our emergency department on 4/28/2025.  She may return to work on 04/29/2025.       If you have any questions or concerns, please don't hesitate to call.      Kacey Magana, APRN-CNP

## 2025-04-29 NOTE — ED TRIAGE NOTES
Pt states that she was eating some chips and she feels like there may be some chips stuck in her throat. Pt's breathing is unlabored, her SpO2 is 99% no resp distress observed

## (undated) DEVICE — SUTURE, MONOCRYL, 2-0, 36 IN, CTX, VIOLET

## (undated) DEVICE — SPONGE, HEMOSTATIC, GELATIN, SURGIFOAM, 8.5 X 12 CM X 2 MM

## (undated) DEVICE — GLOVE, SURGICAL, PROTEXIS PI BLUE W/NEUTHERA, 6.5, PF, LF

## (undated) DEVICE — DRAPE PACK, CESAREAN SECTION, CUSTOM, UHC

## (undated) DEVICE — SUTURE, PDS II, 0 36 IN, CT-1, VIOLET

## (undated) DEVICE — GLOVE, SURGICAL, PROTEXIS PI MICRO, 6.5, PF, LF

## (undated) DEVICE — TOWEL PACK, STERILE, 4/PACK, BLUE

## (undated) DEVICE — SUTURE, PDSII, 1, TP-1, VIL, MONO, 48LP

## (undated) DEVICE — SUTURE, PDS II, 2-0, 27 IN, CT-1 VIL MONO, LF

## (undated) DEVICE — SUTURE, MONOCRYL, 2-0, 27 IN, SH/V-20 , UNDYED

## (undated) DEVICE — SUTURE, VICRYL, 4-0, 27 IN, PS-1, UNDYED